# Patient Record
Sex: MALE | Race: WHITE | NOT HISPANIC OR LATINO | URBAN - METROPOLITAN AREA
[De-identification: names, ages, dates, MRNs, and addresses within clinical notes are randomized per-mention and may not be internally consistent; named-entity substitution may affect disease eponyms.]

---

## 2021-12-30 ENCOUNTER — INPATIENT (INPATIENT)
Facility: HOSPITAL | Age: 41
LOS: 2 days | Discharge: LEFT BEFORE DISCHARGE | DRG: 894 | End: 2022-01-02
Attending: HOSPITALIST | Admitting: HOSPITALIST
Payer: COMMERCIAL

## 2021-12-30 VITALS
SYSTOLIC BLOOD PRESSURE: 164 MMHG | TEMPERATURE: 98 F | WEIGHT: 199.96 LBS | HEART RATE: 110 BPM | DIASTOLIC BLOOD PRESSURE: 89 MMHG | RESPIRATION RATE: 18 BRPM | OXYGEN SATURATION: 94 % | HEIGHT: 72 IN

## 2021-12-30 LAB
ALBUMIN SERPL ELPH-MCNC: 4.6 G/DL — SIGNIFICANT CHANGE UP (ref 3.3–5)
ALP SERPL-CCNC: 129 U/L — HIGH (ref 40–120)
ALT FLD-CCNC: 65 U/L — HIGH (ref 10–45)
ANION GAP SERPL CALC-SCNC: 17 MMOL/L — SIGNIFICANT CHANGE UP (ref 5–17)
APAP SERPL-MCNC: <15 UG/ML — SIGNIFICANT CHANGE UP (ref 10–30)
AST SERPL-CCNC: 60 U/L — HIGH (ref 10–40)
BASOPHILS # BLD AUTO: 0.06 K/UL — SIGNIFICANT CHANGE UP (ref 0–0.2)
BASOPHILS NFR BLD AUTO: 0.7 % — SIGNIFICANT CHANGE UP (ref 0–2)
BILIRUB SERPL-MCNC: 0.2 MG/DL — SIGNIFICANT CHANGE UP (ref 0.2–1.2)
BUN SERPL-MCNC: 19 MG/DL — SIGNIFICANT CHANGE UP (ref 7–23)
CALCIUM SERPL-MCNC: 9.1 MG/DL — SIGNIFICANT CHANGE UP (ref 8.4–10.5)
CHLORIDE SERPL-SCNC: 104 MMOL/L — SIGNIFICANT CHANGE UP (ref 96–108)
CO2 SERPL-SCNC: 19 MMOL/L — LOW (ref 22–31)
CREAT SERPL-MCNC: 1.09 MG/DL — SIGNIFICANT CHANGE UP (ref 0.5–1.3)
EOSINOPHIL # BLD AUTO: 0.1 K/UL — SIGNIFICANT CHANGE UP (ref 0–0.5)
EOSINOPHIL NFR BLD AUTO: 1.1 % — SIGNIFICANT CHANGE UP (ref 0–6)
ETHANOL SERPL-MCNC: 402 MG/DL — HIGH (ref 0–10)
GLUCOSE SERPL-MCNC: 106 MG/DL — HIGH (ref 70–99)
HCT VFR BLD CALC: 48.2 % — SIGNIFICANT CHANGE UP (ref 39–50)
HGB BLD-MCNC: 16.5 G/DL — SIGNIFICANT CHANGE UP (ref 13–17)
IMM GRANULOCYTES NFR BLD AUTO: 0.2 % — SIGNIFICANT CHANGE UP (ref 0–1.5)
LYMPHOCYTES # BLD AUTO: 3.82 K/UL — HIGH (ref 1–3.3)
LYMPHOCYTES # BLD AUTO: 43.4 % — SIGNIFICANT CHANGE UP (ref 13–44)
MCHC RBC-ENTMCNC: 32 PG — SIGNIFICANT CHANGE UP (ref 27–34)
MCHC RBC-ENTMCNC: 34.2 GM/DL — SIGNIFICANT CHANGE UP (ref 32–36)
MCV RBC AUTO: 93.4 FL — SIGNIFICANT CHANGE UP (ref 80–100)
MONOCYTES # BLD AUTO: 0.54 K/UL — SIGNIFICANT CHANGE UP (ref 0–0.9)
MONOCYTES NFR BLD AUTO: 6.1 % — SIGNIFICANT CHANGE UP (ref 2–14)
NEUTROPHILS # BLD AUTO: 4.27 K/UL — SIGNIFICANT CHANGE UP (ref 1.8–7.4)
NEUTROPHILS NFR BLD AUTO: 48.5 % — SIGNIFICANT CHANGE UP (ref 43–77)
NRBC # BLD: 0 /100 WBCS — SIGNIFICANT CHANGE UP (ref 0–0)
PLATELET # BLD AUTO: 230 K/UL — SIGNIFICANT CHANGE UP (ref 150–400)
POTASSIUM SERPL-MCNC: 3.7 MMOL/L — SIGNIFICANT CHANGE UP (ref 3.5–5.3)
POTASSIUM SERPL-SCNC: 3.7 MMOL/L — SIGNIFICANT CHANGE UP (ref 3.5–5.3)
PROT SERPL-MCNC: 7.4 G/DL — SIGNIFICANT CHANGE UP (ref 6–8.3)
RBC # BLD: 5.16 M/UL — SIGNIFICANT CHANGE UP (ref 4.2–5.8)
RBC # FLD: 11.9 % — SIGNIFICANT CHANGE UP (ref 10.3–14.5)
SALICYLATES SERPL-MCNC: <2 MG/DL — LOW (ref 15–30)
SARS-COV-2 RNA SPEC QL NAA+PROBE: SIGNIFICANT CHANGE UP
SODIUM SERPL-SCNC: 140 MMOL/L — SIGNIFICANT CHANGE UP (ref 135–145)
WBC # BLD: 8.81 K/UL — SIGNIFICANT CHANGE UP (ref 3.8–10.5)
WBC # FLD AUTO: 8.81 K/UL — SIGNIFICANT CHANGE UP (ref 3.8–10.5)

## 2021-12-30 PROCEDURE — 99285 EMERGENCY DEPT VISIT HI MDM: CPT

## 2021-12-30 RX ORDER — HALOPERIDOL DECANOATE 100 MG/ML
5 INJECTION INTRAMUSCULAR ONCE
Refills: 0 | Status: COMPLETED | OUTPATIENT
Start: 2021-12-30 | End: 2021-12-30

## 2021-12-30 RX ORDER — DIPHENHYDRAMINE HCL 50 MG
25 CAPSULE ORAL ONCE
Refills: 0 | Status: COMPLETED | OUTPATIENT
Start: 2021-12-30 | End: 2021-12-30

## 2021-12-30 RX ADMIN — HALOPERIDOL DECANOATE 5 MILLIGRAM(S): 100 INJECTION INTRAMUSCULAR at 20:26

## 2021-12-30 RX ADMIN — Medication 25 MILLIGRAM(S): at 20:26

## 2021-12-30 RX ADMIN — Medication 2 MILLIGRAM(S): at 21:26

## 2021-12-30 NOTE — ED ADULT NURSE NOTE - NS ED NURSE LEVEL OF CONSCIOUSNESS MENTAL STATUS
Left message for patient to call back regarding below results    Surgical hx, HM updated  Pt added to waitlist       Awake/Alert

## 2021-12-30 NOTE — ED PROVIDER NOTE - PROGRESS NOTE DETAILS
CYNDI Zambrano MD: Pt extremely agitated, combative, punching doors to patient care room, repeating "I want out!" not able to be re-directed, appears to be a risk of harm to self and others. MIGUEL BENDER called, increased security and staff presence at bedside. Patient unable to be re-directed verbally. Decision made to medicate with haldol, ativan and benadryl. CYNDI Zambrano MD: Pt extremely agitated, combative, punching doors to patient care room, repeating "I want out!", calling staff the N-word, not able to be re-directed, appears to be a risk of harm to self and others. MIGUEL BENDER called, increased security and staff presence at bedside. Patient unable to be re-directed verbally. Decision made to medicate with haldol, ativan and benadryl and to physically restrained. Will re-evaluate. Pt signed out to me - on reassessment w tongue fasciculations, tremors, and tachycardic. Clinically withdrawing. CIWA ordered, given ativan. PT will need admission to hospitalist team, given CIWA recorded to be 18 at 9am by RN will need ICU consult. MILTON Garcia PGY3 Not a MICU candidate. Ordered for standing ativan per MICU reccs. Endorsed to hospitalist. MILTON Garcia PGY3
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
## 2021-12-30 NOTE — ED PROVIDER NOTE - PHYSICAL EXAMINATION
Gen: Alert and oriented. Aggressive behavior, shouting at staff.  HEENT: extra occular movements intact, no nasal discharge, mucous membranes moist  CV: Regular rate and rhythm, +S1/S2, no murmurs/rubs/gallops,   Resp: Clear to ausculation bilaterally, no wheezes/rhonchi/rales  GI: Abdomen soft non-distended, non tender to palpation, no masses  MSK: No open wounds, no bruising, no LE edema, Homans sign negative bl  Neuro: A&Ox4, following commands, moving all four extremities spontaneously  Psych: aggressive. Not redirectable Gen: Alert and oriented. Aggressive behavior, shouting at staff.  HEENT: extra occular movements intact, no nasal discharge, mucous membranes moist  CV: Unable to assess given aggression but extremities appear well perfused  Resp: No increased work of breathing. Unable to auscultate given aggression  GI: Unable to assess given aggression  MSK: No deformities or injuries appreciated but unable to fully assess to aggression  Neuro: A&Ox4, can follow commands, moving all four extremities spontaneously  Psych: aggressive. Not redirectable
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
## 2021-12-30 NOTE — ED PROVIDER NOTE - OBJECTIVE STATEMENT
40 yo M with pmh depression, cocaine use presenting with suicidal ideation. Patient presents acutely intoxicated and aggressive (Calling staff "N" word and acting with aggression toward staff). Therefore, patient was sedated and now resting comfortably. Per EMS, sister called because patient was endorsing SI after having drank a lot today.

## 2021-12-31 DIAGNOSIS — R45.851 SUICIDAL IDEATIONS: ICD-10-CM

## 2021-12-31 DIAGNOSIS — F19.10 OTHER PSYCHOACTIVE SUBSTANCE ABUSE, UNCOMPLICATED: ICD-10-CM

## 2021-12-31 DIAGNOSIS — F10.239 ALCOHOL DEPENDENCE WITH WITHDRAWAL, UNSPECIFIED: ICD-10-CM

## 2021-12-31 DIAGNOSIS — U07.1 COVID-19: ICD-10-CM

## 2021-12-31 DIAGNOSIS — Z29.9 ENCOUNTER FOR PROPHYLACTIC MEASURES, UNSPECIFIED: ICD-10-CM

## 2021-12-31 LAB
AMPHET UR-MCNC: NEGATIVE — SIGNIFICANT CHANGE UP
APPEARANCE UR: CLEAR — SIGNIFICANT CHANGE UP
BACTERIA # UR AUTO: NEGATIVE — SIGNIFICANT CHANGE UP
BARBITURATES UR SCN-MCNC: NEGATIVE — SIGNIFICANT CHANGE UP
BENZODIAZ UR-MCNC: NEGATIVE — SIGNIFICANT CHANGE UP
BILIRUB UR-MCNC: NEGATIVE — SIGNIFICANT CHANGE UP
COCAINE METAB.OTHER UR-MCNC: NEGATIVE — SIGNIFICANT CHANGE UP
COLOR SPEC: YELLOW — SIGNIFICANT CHANGE UP
DIFF PNL FLD: NEGATIVE — SIGNIFICANT CHANGE UP
EPI CELLS # UR: 1 /HPF — SIGNIFICANT CHANGE UP
FLUAV AG NPH QL: SIGNIFICANT CHANGE UP
FLUBV AG NPH QL: SIGNIFICANT CHANGE UP
GLUCOSE UR QL: NEGATIVE — SIGNIFICANT CHANGE UP
HYALINE CASTS # UR AUTO: 2 /LPF — SIGNIFICANT CHANGE UP (ref 0–2)
KETONES UR-MCNC: ABNORMAL
LEUKOCYTE ESTERASE UR-ACNC: NEGATIVE — SIGNIFICANT CHANGE UP
METHADONE UR-MCNC: NEGATIVE — SIGNIFICANT CHANGE UP
NITRITE UR-MCNC: NEGATIVE — SIGNIFICANT CHANGE UP
OPIATES UR-MCNC: NEGATIVE — SIGNIFICANT CHANGE UP
OXYCODONE UR-MCNC: NEGATIVE — SIGNIFICANT CHANGE UP
PCP SPEC-MCNC: SIGNIFICANT CHANGE UP
PCP UR-MCNC: NEGATIVE — SIGNIFICANT CHANGE UP
PH UR: 6 — SIGNIFICANT CHANGE UP (ref 5–8)
PROT UR-MCNC: ABNORMAL
RBC CASTS # UR COMP ASSIST: 2 /HPF — SIGNIFICANT CHANGE UP (ref 0–4)
RSV RNA NPH QL NAA+NON-PROBE: SIGNIFICANT CHANGE UP
SARS-COV-2 RNA SPEC QL NAA+PROBE: DETECTED
SP GR SPEC: 1.03 — HIGH (ref 1.01–1.02)
THC UR QL: NEGATIVE — SIGNIFICANT CHANGE UP
TSH SERPL-MCNC: 0.53 UIU/ML — SIGNIFICANT CHANGE UP (ref 0.27–4.2)
UROBILINOGEN FLD QL: NEGATIVE — SIGNIFICANT CHANGE UP
WBC UR QL: 1 /HPF — SIGNIFICANT CHANGE UP (ref 0–5)

## 2021-12-31 PROCEDURE — 99222 1ST HOSP IP/OBS MODERATE 55: CPT | Mod: GC

## 2021-12-31 PROCEDURE — 99223 1ST HOSP IP/OBS HIGH 75: CPT

## 2021-12-31 RX ORDER — THIAMINE MONONITRATE (VIT B1) 100 MG
100 TABLET ORAL ONCE
Refills: 0 | Status: COMPLETED | OUTPATIENT
Start: 2021-12-31 | End: 2021-12-31

## 2021-12-31 RX ORDER — ENOXAPARIN SODIUM 100 MG/ML
40 INJECTION SUBCUTANEOUS DAILY
Refills: 0 | Status: DISCONTINUED | OUTPATIENT
Start: 2021-12-31 | End: 2022-01-02

## 2021-12-31 RX ORDER — THIAMINE MONONITRATE (VIT B1) 100 MG
200 TABLET ORAL DAILY
Refills: 0 | Status: DISCONTINUED | OUTPATIENT
Start: 2021-12-31 | End: 2021-12-31

## 2021-12-31 RX ORDER — THIAMINE MONONITRATE (VIT B1) 100 MG
500 TABLET ORAL EVERY 8 HOURS
Refills: 0 | Status: DISCONTINUED | OUTPATIENT
Start: 2021-12-31 | End: 2022-01-02

## 2021-12-31 RX ORDER — THIAMINE MONONITRATE (VIT B1) 100 MG
500 TABLET ORAL EVERY 8 HOURS
Refills: 0 | Status: DISCONTINUED | OUTPATIENT
Start: 2021-12-31 | End: 2021-12-31

## 2021-12-31 RX ORDER — THIAMINE MONONITRATE (VIT B1) 100 MG
100 TABLET ORAL DAILY
Refills: 0 | Status: CANCELLED | OUTPATIENT
Start: 2022-01-04 | End: 2022-01-02

## 2021-12-31 RX ORDER — FOLIC ACID 0.8 MG
1 TABLET ORAL ONCE
Refills: 0 | Status: COMPLETED | OUTPATIENT
Start: 2021-12-31 | End: 2021-12-31

## 2021-12-31 RX ORDER — FOLIC ACID 0.8 MG
1 TABLET ORAL DAILY
Refills: 0 | Status: DISCONTINUED | OUTPATIENT
Start: 2021-12-31 | End: 2022-01-02

## 2021-12-31 RX ORDER — HYDRALAZINE HCL 50 MG
10 TABLET ORAL ONCE
Refills: 0 | Status: COMPLETED | OUTPATIENT
Start: 2021-12-31 | End: 2021-12-31

## 2021-12-31 RX ADMIN — Medication 2 MILLIGRAM(S): at 14:41

## 2021-12-31 RX ADMIN — Medication 1 MILLIGRAM(S): at 11:34

## 2021-12-31 RX ADMIN — Medication 2 MILLIGRAM(S): at 12:38

## 2021-12-31 RX ADMIN — Medication 10 MILLIGRAM(S): at 17:24

## 2021-12-31 RX ADMIN — Medication 2 MILLIGRAM(S): at 10:37

## 2021-12-31 RX ADMIN — Medication 4 MILLIGRAM(S): at 22:03

## 2021-12-31 RX ADMIN — Medication 50 MILLIGRAM(S): at 15:09

## 2021-12-31 RX ADMIN — Medication 100 MILLIGRAM(S): at 11:34

## 2021-12-31 RX ADMIN — Medication 105 MILLIGRAM(S): at 23:10

## 2021-12-31 RX ADMIN — Medication 4 MILLIGRAM(S): at 17:25

## 2021-12-31 NOTE — H&P ADULT - NSHPREVIEWOFSYSTEMS_GEN_ALL_CORE
REVIEW OF SYSTEMS:    CONSTITUTIONAL: No weakness, fevers or chills  EYES/ENT: No visual changes;  No vertigo or throat pain   NECK: No pain or stiffness  RESPIRATORY: No cough, wheezing, hemoptysis; No shortness of breath  CARDIOVASCULAR: No chest pain or palpitations  GASTROINTESTINAL: No abdominal or epigastric pain. No nausea, vomiting, or hematemesis; No diarrhea or constipation. No melena or hematochezia.  GENITOURINARY: No dysuria, frequency or hematuria  NEUROLOGICAL: No numbness or weakness  All other review of systems is negative unless indicated above. REVIEW OF SYSTEMS:    CONSTITUTIONAL: No weakness, fevers or chills  EYES/ENT: No visual changes;  No vertigo or throat pain   NECK: No pain or stiffness  RESPIRATORY: + cough, no wheezing, hemoptysis; No shortness of breath  CARDIOVASCULAR: No chest pain or palpitations  GASTROINTESTINAL: No abdominal or epigastric pain. No nausea, vomiting, or hematemesis; No diarrhea or constipation. No melena or hematochezia.  GENITOURINARY: No dysuria, frequency or hematuria  NEUROLOGICAL: No numbness or weakness  All other review of systems is negative unless indicated above.

## 2021-12-31 NOTE — CONSULT NOTE ADULT - ASSESSMENT
40 yo M with pmh depression, cocaine use presenting with alcohol withdrawal, MICU consulted for CIWA 18.   40 yo M with pmh depression, cocaine use presenting with alcohol withdrawal, MICU consulted for CIWA 18.    Not a MICU candidate at this time  Recommend standing ativan regimen instead of symptom triggered vs consider high risk ativan taper instead of symptom triggered  Start high dose thiamine, folic acid     NOTE NOT FINAL 42 yo M with pmh depression, cocaine use presenting with alcohol withdrawal, MICU consulted for CIWA 18.    Not a MICU candidate at this time  Recommend standing ativan regimen instead of symptom triggered vs consider high risk ativan taper instead of symptom triggered  Start high dose thiamine, folic acid   Monitor for refeeding syndrome  Psych cx.   Monitor LFT, and check DF.  Monitor respiratory status for covid.

## 2021-12-31 NOTE — H&P ADULT - NSHPPHYSICALEXAM_GEN_ALL_CORE
GENERAL: NAD, lying in bed comfortably  HEAD:  Atraumatic, Normocephalic  EYES: EOMI, PERRLA, conjunctiva and sclera clear  ENT: Moist mucous membranes  NECK: Supple, No JVD  CHEST/LUNG: Clear to auscultation bilaterally; No rales, rhonchi, wheezing, or rubs. Unlabored respirations  HEART: Regular rate and rhythm; No murmurs, rubs, or gallops  ABDOMEN: Bowel sounds present; Soft, Nontender, Nondistended. No hepatomegaly  EXTREMITIES:  2+ Peripheral Pulses, brisk capillary refill. No clubbing, cyanosis, or edema  NERVOUS SYSTEM:  Alert & Oriented X3, speech clear. No deficits   MSK: FROM all 4 extremities, full and equal strength  SKIN: No rashes or lesions GENERAL: NAD, lying in bed, diaphoretic  HEAD:  Atraumatic, Normocephalic  EYES: EOMI, PERRLA, conjunctiva and sclera clear  ENT: Moist mucous membranes  NECK: Supple, No JVD  CHEST/LUNG: Clear to auscultation bilaterally; No rales, rhonchi, wheezing, or rubs. Unlabored respirations  HEART: Regular rate and rhythm; No murmurs, rubs, or gallops  ABDOMEN: Bowel sounds present; Soft, Nontender, Nondistended. No hepatomegaly  EXTREMITIES:  2+ Peripheral Pulses, brisk capillary refill. No clubbing, cyanosis, or edema  NERVOUS SYSTEM:  Alert & Oriented X3, speech clear, tremors noted  MSK: FROM all 4 extremities, full and equal strength  SKIN: No rashes or lesions Vital Signs Last 24 Hrs  T(C): 36.8 (31 Dec 2021 16:30), Max: 37.1 (31 Dec 2021 04:00)  T(F): 98.3 (31 Dec 2021 16:30), Max: 98.8 (31 Dec 2021 04:00)  HR: 112 (31 Dec 2021 16:30) (76 - 112)  BP: 151/101 (31 Dec 2021 16:30) (136/78 - 168/110)  BP(mean): --  RR: 20 (31 Dec 2021 16:30) (16 - 20)  SpO2: 99% (31 Dec 2021 16:30) (94% - 100%)  GENERAL: NAD, lying in bed, diaphoretic  HEAD:  Atraumatic, Normocephalic  EYES: EOMI, PERRLA, conjunctiva and sclera clear  ENT: Moist mucous membranes  NECK: Supple, No JVD  CHEST/LUNG: Clear to auscultation bilaterally; No rales, rhonchi, wheezing, or rubs. Unlabored respirations  HEART: Regular rate and rhythm; No murmurs, rubs, or gallops  ABDOMEN: Bowel sounds present; Soft, Nontender, Nondistended. No hepatomegaly  EXTREMITIES:  2+ Peripheral Pulses, brisk capillary refill. No clubbing, cyanosis, or edema  NERVOUS SYSTEM:  Alert & Oriented X3, speech clear, tremors noted  MSK: FROM all 4 extremities, full and equal strength  SKIN: No rashes or lesions

## 2021-12-31 NOTE — H&P ADULT - PROBLEM SELECTOR PLAN 2
- PCR positive on 12/30, but has been feeling symptoms since last Saturday  - Currently saturating well on RA  - Conservative mgmt - PCR positive on 12/30, but has been feeling symptoms since last Saturday  - Currently saturating well on RA  - Non-vaccinated   - Conservative mgmt

## 2021-12-31 NOTE — ED ADULT NURSE REASSESSMENT NOTE - NS ED NURSE REASSESS COMMENT FT1
Ifeoma, spouse, [phone].
PT tearful, anxious.  CIWA 18 and attending and resident aware.
Pt combative throwing chair in room and punching door and threatening staff. Security called to bedside, MD aware, 1:1 in place.
Pt has been tachycardic and hypertensive all day.  CIWA above 13 since this morning.  Discussed at length with ED resident and medical resident q hourly CIWA scores and additional medications.  MICU refused pt.  MEdical resident also rejected additional CIWA/medication.
Pt reports feeling better after multi-medication modalities .  Comfortable appearing, resting.  Tremors visible with arms extended.  Call placed to pt's spouse Ifeoma.

## 2021-12-31 NOTE — CONSULT NOTE ADULT - ATTENDING COMMENTS
Agree with assessment and plan as discussed above. Patient with alcohol withdrawal, monitor CIWA, treat per CIWA protocol. Monitor for refeeding syndrome. Given thiamine. Monitor covid symptoms.     - Patient does not need MICU level of care at this time. Please reconsult if there are any changes in clinical status, hemodynamic parameters or worsening metabolic derangement.

## 2021-12-31 NOTE — H&P ADULT - PROBLEM SELECTOR PLAN 3
- Pt was intoxicated when stating he had SI, now denying SI/HI  - Psych follow-up - History cocaine, marijuana use  - Avoid beta blockers

## 2021-12-31 NOTE — H&P ADULT - NSHPLABSRESULTS_GEN_ALL_CORE
16.5   8.81  )-----------( 230      ( 30 Dec 2021 20:53 )             48.2       12-30    140  |  104  |  19  ----------------------------<  106<H>  3.7   |  19<L>  |  1.09    Ca    9.1      30 Dec 2021 20:53    TPro  7.4  /  Alb  4.6  /  TBili  0.2  /  DBili  x   /  AST  60<H>  /  ALT  65<H>  /  AlkPhos  129<H>  12-30                      Lactate Trend            CAPILLARY BLOOD GLUCOSE

## 2021-12-31 NOTE — H&P ADULT - ATTENDING COMMENTS
AGree with above. PAtient with ETOH misuse admitted with ETOH withdrawals, with CIWA up to 18. Also has mild COVID symptoms of UTI. :No prior intubation, no prior MICU, no prior hospitalizations for ETOH.    Labs reviewed: Mild anion gap, DF score <32  ECG personally reviewed; Sinus tachycardia without evidence of ST segment changes.     A/P 42 yo male admitted with ETOH withdrawal, COVID    #ETOH misuse with withdrawal  -given CIWA of 18, continue high risk CIWA with taper.  -social work consult  -given elevated AG, will presume lactic acidosis, give high dose thiamine  -DF <32, no need for steroid  -given normal plt, bili, albumin will hold off US liver as no biochemical evidence of cirrhosis.    #COVID  -mild symptoms, is 41 without risk factor for deterioration so would not qualify for mAB. saturating  99% RA, would not give remdesivir and not indicated for steroids at this time.  -clinically monitor    rest as above

## 2021-12-31 NOTE — CONSULT NOTE ADULT - SUBJECTIVE AND OBJECTIVE BOX
CHIEF COMPLAINT: Alcohol withdrawal     HPI:   40 yo M with pmh depression, cocaine use presenting with suicidal ideation. Patient presents acutely intoxicated and aggressive (Calling staff "N" word and acting with aggression toward staff). Therefore, patient was sedated and now resting comfortably. Per EMS, sister called because patient was endorsing SI after having drank a lot today.    Patient drinks 1pint of liquor a day for the past year. Never hospitalized for withdrawal in the past, no prior history of seizures. When patient with withdrawal symptoms in the past, he would self-medicate with alcohol.       PAST MEDICAL & SURGICAL HISTORY:  Depression, substance abuse       FAMILY HISTORY:      SOCIAL HISTORY:  Smoking: __ packs x ___ years  EtOH Use:  Marital Status:  Occupation:  Recent Travel:  Country of Birth:  Advance Directives:    Allergies    No Known Allergies    Intolerances        HOME MEDICATIONS:    REVIEW OF SYSTEMS:  Constitutional:   Eyes:  ENT:  CV:  Resp:  GI:  :  MSK:  Integumentary:  Neurological:  Psychiatric:  Endocrine:  Hematologic/Lymphatic:  Allergic/Immunologic:  [ ] All other systems negative  [ ] Unable to assess ROS because ________    OBJECTIVE:  ICU Vital Signs Last 24 Hrs  T(C): 37.1 (31 Dec 2021 04:00), Max: 37.1 (31 Dec 2021 04:00)  T(F): 98.8 (31 Dec 2021 04:00), Max: 98.8 (31 Dec 2021 04:00)  HR: 110 (31 Dec 2021 09:10) (76 - 110)  BP: 148/97 (31 Dec 2021 09:10) (136/78 - 164/89)  BP(mean): --  ABP: --  ABP(mean): --  RR: 20 (31 Dec 2021 09:10) (16 - 20)  SpO2: 98% (31 Dec 2021 09:10) (94% - 99%)        CAPILLARY BLOOD GLUCOSE          PHYSICAL EXAM:  General:   HEENT:   Lymph Nodes:  Neck:   Respiratory:   Cardiovascular:   Abdomen:   Extremities:   Skin:   Neurological:  Psychiatry:    HOSPITAL MEDICATIONS:  MEDICATIONS  (STANDING):    MEDICATIONS  (PRN):  LORazepam   Injectable 2 milliGRAM(s) IV Push every 2 hours PRN Symptom-triggered: each CIWA -Ar score 8 or GREATER      LABS:                        16.5   8.81  )-----------( 230      ( 30 Dec 2021 20:53 )             48.2     12-30    140  |  104  |  19  ----------------------------<  106<H>  3.7   |  19<L>  |  1.09    Ca    9.1      30 Dec 2021 20:53    TPro  7.4  /  Alb  4.6  /  TBili  0.2  /  DBili  x   /  AST  60<H>  /  ALT  65<H>  /  AlkPhos  129<H>  12-30              MICROBIOLOGY:     RADIOLOGY:  [ ] Reviewed and interpreted by me    EKG: CHIEF COMPLAINT: Alcohol withdrawal     HPI:   42 yo M with pmh depression, cocaine use presenting with suicidal ideation. Patient presents acutely intoxicated and aggressive (Calling staff "N" word and acting with aggression toward staff). Therefore, patient was sedated and now resting comfortably. Per EMS, sister called because patient was endorsing SI after having drank a lot today.    Patient seen after 4mg IV ativan administered, patient tremulous however calm and conversant. Patient has been drinking "forever," currently drinks 1pint of liquor a day for the past year. Never hospitalized for withdrawal in the past, no prior history of seizures. When patient with withdrawal symptoms in the past, he would self-medicate with alcohol. Patient denies headache, visual changes, hallucinations, cough, SOB, n/v/d/c, dysuria.       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 No nausea, vomiting, or hematemesis; No diarrhea or constipation. No melena or hematochezia.  BACK: No CVA tenderness  GENITOURINARY: No dysuria, frequency or hematuria  NEUROLOGICAL: No numbness or weakness  SKIN: No itching, rashes      OBJECTIVE:  ICU Vital Signs Last 24 Hrs  T(C): 37.1 (31 Dec 2021 04:00), Max: 37.1 (31 Dec 2021 04:00)  T(F): 98.8 (31 Dec 2021 04:00), Max: 98.8 (31 Dec 2021 04:00)  HR: 110 (31 Dec 2021 09:10) (76 - 110)  BP: 148/97 (31 Dec 2021 09:10) (136/78 - 164/89)  BP(mean): --  ABP: --  ABP(mean): --  RR: 20 (31 Dec 2021 09:10) (16 - 20)  SpO2: 98% (31 Dec 2021 09:10) (94% - 99%)        CAPILLARY BLOOD GLUCOSE          PHYSICAL EXAM:  GENERAL: Tremulous, conversant  HEAD:  Atraumatic, Normocephalic  EYES: EOMI, PERRLA, conjunctiva and sclera clear  NECK: Supple, no lymphadenopathy, no JVD  CHEST/LUNG: CTAB; No wheezes, rales, or rhonchi  HEART: Regular rate and rhythm; No murmurs, rubs, or gallops  ABDOMEN: Soft, non-tender, non-distended; normal bowel sounds, no organomegaly  EXTREMITIES:  2+ peripheral pulses b/l, No clubbing, cyanosis, or edema  NEUROLOGY: A&O x 3, no focal deficits  SKIN: No rashes or lesions  PSYCH: normal behavior, normal affect, normal speech    HOSPITAL MEDICATIONS:  MEDICATIONS  (STANDING):    MEDICATIONS  (PRN):  LORazepam   Injectable 2 milliGRAM(s) IV Push every 2 hours PRN Symptom-triggered: each CIWA -Ar score 8 or GREATER      LABS:                        16.5   8.81  )-----------( 230      ( 30 Dec 2021 20:53 )             48.2     12-30    140  |  104  |  19  ----------------------------<  106<H>  3.7   |  19<L>  |  1.09    Ca    9.1      30 Dec 2021 20:53    TPro  7.4  /  Alb  4.6  /  TBili  0.2  /  DBili  x   /  AST  60<H>  /  ALT  65<H>  /  AlkPhos  129<H>  12-30              MICROBIOLOGY:     RADIOLOGY:  [ ] Reviewed and interpreted by me    EKG:

## 2021-12-31 NOTE — H&P ADULT - PROBLEM SELECTOR PLAN 1
- Pt admits to drinking 1 pint bourbon every day  - Last drink last night, unknown time  - No previous history of hospitalizations for withdrawal or seizures  - MICU consulted due to elevated CIWA scores, not candidate for admission  - CIWA protocol  - Ativan taper and symptom-triggered  - Thiamine, folic acid, multivitamin - Pt admits to drinking 1 pint bourbon every day  - Last drink last night, unknown time  - No previous history of hospitalizations for withdrawal or seizures  - Currently experiencing withdrawal symptoms: anxiety, agitation, restlessness, tremor, diaphoresis  - Concern for delirium tremens with disturbance of cognition accompanied by tachycardia and hypertension, although time course not consistent with onset   - MICU consulted due to elevated CIWA scores, not candidate for admission  - c/w CIWA protocol  - c/w Ativan taper and symptom-triggered  - c/w thiamine, folic acid, multivitamin

## 2021-12-31 NOTE — H&P ADULT - HISTORY OF PRESENT ILLNESS
41M with PMH depression, alcohol use presents to the hospital overnight intoxicated and suicidal. Pt was aggressive and verbally abusive towards staff. He was noted to be yelling profanity and stating that he was trying to get to his sister's house to obtain more alcohol to kill himself. He was sedated in the ED with ativan, haldol, and benadryl overnight. This morning, he was more calm and alert, but did not have clear memory of what happened the night prior. He stated he has been drinking since he was 13 years old, and now drinks a pint of bourbon every day. He works as an  and drinks every day when he comes back home. He also smokes 1 ppd, but denies any other drug abuse, though states he has smoked marijuana. He admits to experiencing withdrawal in the past, but has never been hospitalized for. Denies withdrawal seizures. He denies chest pain, abdominal pain, sob, n/v/d, fevers, chills.     In the ED:    Vitals: /89, , T 98.3    Pt given folic acid 1, ativan 2 mg x2, thiamine 100, haldol 5, benadryl 25 41M with PMH depression, alcohol use presents to the hospital overnight intoxicated and suicidal. Pt was aggressive and verbally abusive towards staff. He was noted to be yelling profanity and stating that he was trying to get to his sister's house to obtain more alcohol to kill himself. He was sedated in the ED with ativan, haldol, and benadryl overnight. This morning, he was more calm and alert, but did not have clear memory of what happened the night prior. He stated he has been drinking since he was 13 years old, and now drinks a pint of bourbon every day. He works as an  and drinks every day when he comes back home. He also smokes 1 ppd, but denies any other drug abuse, though states he has smoked marijuana. He admits to experiencing withdrawal in the past, but has never been hospitalized for. Denies withdrawal seizures. He denies chest pain, abdominal pain, sob, n/v/d, fevers, chills, but does a runny nose and sore throat since last Saturday. He takes a medication for anxiety, but does not know the name.     In the ED:    Vitals: /89, , T 98.3    Pt given folic acid 1, ativan 2 mg x2, thiamine 100, haldol 5, benadryl 25 41M with PMH depression, alcohol use presents to the hospital overnight intoxicated and suicidal. Pt was aggressive and verbally abusive towards staff. He was noted to be yelling profanity and stating that he was trying to get to his sister's house to obtain more alcohol to kill himself. He was sedated in the ED with ativan, haldol, and benadryl overnight. This morning, he was more calm and alert, but did not have clear memory of what happened the night prior. He stated he has been drinking since he was 13 years old, and now drinks a pint of bourbon every day. He works as an  and drinks every day when he comes back home. He also smokes 1 ppd, but denies any other drug abuse, though states he has smoked marijuana. He admits to experiencing withdrawal in the past, but has never been hospitalized for. Denies withdrawal seizures. He denies chest pain, abdominal pain, sob, n/v/d, fevers, chills, but does a runny nose and sore throat since last Saturday. He is not vaccinated. He takes a medication for anxiety, but does not know the name.     In the ED:    Vitals: /89, , T 98.3    Pt given folic acid 1, ativan 2 mg x2, thiamine 100, haldol 5, benadryl 25

## 2021-12-31 NOTE — H&P ADULT - ASSESSMENT
41M with PMH depression, alcohol use admitted for alcohol withdrawal and COVID-19. On [facility] protocol.

## 2021-12-31 NOTE — H&P ADULT - PROBLEM SELECTOR PLAN 4
- Diet: Regular  - DVT ppx: SCDs  - Dispo: Home - Pt was intoxicated when stating he had SI, now denying SI/HI  - Psych follow-up

## 2022-01-01 ENCOUNTER — TRANSCRIPTION ENCOUNTER (OUTPATIENT)
Age: 42
End: 2022-01-01

## 2022-01-01 LAB
A1C WITH ESTIMATED AVERAGE GLUCOSE RESULT: 5.3 % — SIGNIFICANT CHANGE UP (ref 4–5.6)
ALBUMIN SERPL ELPH-MCNC: 4.7 G/DL — SIGNIFICANT CHANGE UP (ref 3.3–5)
ALP SERPL-CCNC: 107 U/L — SIGNIFICANT CHANGE UP (ref 40–120)
ALT FLD-CCNC: 52 U/L — HIGH (ref 10–45)
ANION GAP SERPL CALC-SCNC: 17 MMOL/L — SIGNIFICANT CHANGE UP (ref 5–17)
AST SERPL-CCNC: 53 U/L — HIGH (ref 10–40)
BILIRUB SERPL-MCNC: 1 MG/DL — SIGNIFICANT CHANGE UP (ref 0.2–1.2)
BUN SERPL-MCNC: 18 MG/DL — SIGNIFICANT CHANGE UP (ref 7–23)
CALCIUM SERPL-MCNC: 9.9 MG/DL — SIGNIFICANT CHANGE UP (ref 8.4–10.5)
CHLORIDE SERPL-SCNC: 95 MMOL/L — LOW (ref 96–108)
CO2 SERPL-SCNC: 23 MMOL/L — SIGNIFICANT CHANGE UP (ref 22–31)
CREAT SERPL-MCNC: 0.66 MG/DL — SIGNIFICANT CHANGE UP (ref 0.5–1.3)
D DIMER BLD IA.RAPID-MCNC: <150 NG/ML DDU — SIGNIFICANT CHANGE UP
ESTIMATED AVERAGE GLUCOSE: 105 MG/DL — SIGNIFICANT CHANGE UP (ref 68–114)
GLUCOSE SERPL-MCNC: 99 MG/DL — SIGNIFICANT CHANGE UP (ref 70–99)
HCT VFR BLD CALC: 50.2 % — HIGH (ref 39–50)
HGB BLD-MCNC: 17.6 G/DL — HIGH (ref 13–17)
INR BLD: 0.96 RATIO — SIGNIFICANT CHANGE UP (ref 0.88–1.16)
MAGNESIUM SERPL-MCNC: 2.2 MG/DL — SIGNIFICANT CHANGE UP (ref 1.6–2.6)
MCHC RBC-ENTMCNC: 32.1 PG — SIGNIFICANT CHANGE UP (ref 27–34)
MCHC RBC-ENTMCNC: 35.1 GM/DL — SIGNIFICANT CHANGE UP (ref 32–36)
MCV RBC AUTO: 91.4 FL — SIGNIFICANT CHANGE UP (ref 80–100)
NRBC # BLD: 0 /100 WBCS — SIGNIFICANT CHANGE UP (ref 0–0)
PHOSPHATE SERPL-MCNC: 3.8 MG/DL — SIGNIFICANT CHANGE UP (ref 2.5–4.5)
PLATELET # BLD AUTO: 192 K/UL — SIGNIFICANT CHANGE UP (ref 150–400)
POTASSIUM SERPL-MCNC: 3.6 MMOL/L — SIGNIFICANT CHANGE UP (ref 3.5–5.3)
POTASSIUM SERPL-SCNC: 3.6 MMOL/L — SIGNIFICANT CHANGE UP (ref 3.5–5.3)
PROT SERPL-MCNC: 7.6 G/DL — SIGNIFICANT CHANGE UP (ref 6–8.3)
PROTHROM AB SERPL-ACNC: 11.5 SEC — SIGNIFICANT CHANGE UP (ref 10.6–13.6)
RBC # BLD: 5.49 M/UL — SIGNIFICANT CHANGE UP (ref 4.2–5.8)
RBC # FLD: 11.6 % — SIGNIFICANT CHANGE UP (ref 10.3–14.5)
SODIUM SERPL-SCNC: 135 MMOL/L — SIGNIFICANT CHANGE UP (ref 135–145)
WBC # BLD: 7.67 K/UL — SIGNIFICANT CHANGE UP (ref 3.8–10.5)
WBC # FLD AUTO: 7.67 K/UL — SIGNIFICANT CHANGE UP (ref 3.8–10.5)

## 2022-01-01 PROCEDURE — 99233 SBSQ HOSP IP/OBS HIGH 50: CPT | Mod: GC

## 2022-01-01 RX ORDER — NICOTINE POLACRILEX 2 MG
1 GUM BUCCAL DAILY
Refills: 0 | Status: DISCONTINUED | OUTPATIENT
Start: 2022-01-01 | End: 2022-01-02

## 2022-01-01 RX ORDER — LANOLIN ALCOHOL/MO/W.PET/CERES
1 CREAM (GRAM) TOPICAL AT BEDTIME
Refills: 0 | Status: DISCONTINUED | OUTPATIENT
Start: 2022-01-01 | End: 2022-01-02

## 2022-01-01 RX ORDER — ESCITALOPRAM OXALATE 10 MG/1
5 TABLET, FILM COATED ORAL DAILY
Refills: 0 | Status: DISCONTINUED | OUTPATIENT
Start: 2022-01-01 | End: 2022-01-02

## 2022-01-01 RX ADMIN — Medication 105 MILLIGRAM(S): at 15:41

## 2022-01-01 RX ADMIN — Medication 1 PATCH: at 20:08

## 2022-01-01 RX ADMIN — Medication 105 MILLIGRAM(S): at 22:07

## 2022-01-01 RX ADMIN — ESCITALOPRAM OXALATE 5 MILLIGRAM(S): 10 TABLET, FILM COATED ORAL at 15:41

## 2022-01-01 RX ADMIN — Medication 4 MILLIGRAM(S): at 09:44

## 2022-01-01 RX ADMIN — ENOXAPARIN SODIUM 40 MILLIGRAM(S): 100 INJECTION SUBCUTANEOUS at 12:33

## 2022-01-01 RX ADMIN — Medication 4 MILLIGRAM(S): at 02:07

## 2022-01-01 RX ADMIN — Medication 105 MILLIGRAM(S): at 06:19

## 2022-01-01 RX ADMIN — Medication 1 TABLET(S): at 12:32

## 2022-01-01 RX ADMIN — Medication 2 MILLIGRAM(S): at 12:33

## 2022-01-01 RX ADMIN — Medication 3 MILLIGRAM(S): at 22:07

## 2022-01-01 RX ADMIN — Medication 3 MILLIGRAM(S): at 18:14

## 2022-01-01 RX ADMIN — Medication 3 MILLIGRAM(S): at 14:20

## 2022-01-01 RX ADMIN — Medication 1 MILLIGRAM(S): at 23:20

## 2022-01-01 RX ADMIN — Medication 4 MILLIGRAM(S): at 06:19

## 2022-01-01 RX ADMIN — Medication 2 MILLIGRAM(S): at 20:33

## 2022-01-01 RX ADMIN — Medication 1 MILLIGRAM(S): at 12:32

## 2022-01-01 RX ADMIN — Medication 1 PATCH: at 15:42

## 2022-01-01 NOTE — DISCHARGE NOTE PROVIDER - NSDCFUADDAPPT_GEN_ALL_CORE_FT
[facility] program: [phone]. Please call this number for further guidance on how to reduce your alcohol use

## 2022-01-01 NOTE — DISCHARGE NOTE PROVIDER - CARE PROVIDERS DIRECT ADDRESSES
[email or web address]

## 2022-01-01 NOTE — DISCHARGE NOTE PROVIDER - NSDCCPCAREPLAN_GEN_ALL_CORE_FT
PRINCIPAL DISCHARGE DIAGNOSIS  Diagnosis: Alcohol withdrawal  Assessment and Plan of Treatment: Alcohol withdrawal occurs when you stop drinking, or you drink less while having alcohol dependence. Symptoms begin as your body tries to get used to this change. You will be diagnosed with alcohol withdrawal if you have at least two symptoms after you limit or stop drinking. Common symptoms include shaking, throwing up, and sweating. These symptoms may make you anxious and unable to work or be around others. Symptoms occur within several hours to a few days after stopping alcohol, and are not caused by other health problems.  Treatment for alcohol dependence and withdrawal includes medicines, detoxification, and therapy. Diagnosing and treating alcohol dependence and withdrawal as soon as possible may relieve or prevent symptoms. With treatment and care, your alcohol dependence and withdrawal may be controlled, and your quality of life improved.  Avoid drinking alcohol: Set a goal for yourself to completely stop drinking. This will stop you from being dependent on it. This will also prevent you from developing alcohol withdrawal and other more serious health problems. Ask your caregiver if you should more of other liquids, such as water. Avoid caffeine, which may be found in coffee, tea, and sports drinks.  CONTACT A CAREGIVER IF: You cannot make it to your next meeting with your caregiver.  You feel you cannot cope at home, work, or in school.  You have new symptoms since the last time you visited your caregiver. Your symptoms are getting worse.  You have questions or concerns about your alcohol dependence or withdrawal, medicine, or care.  SEEK CARE IMMEDIATELY IF: You just had a convulsion. You have trouble breathing, chest pains, or a fast heartbeat. You feel like hurting yourself or someone else.

## 2022-01-01 NOTE — DISCHARGE NOTE PROVIDER - HOSPITAL COURSE
41M with PMH depression, alcohol use presents to the hospital overnight intoxicated and suicidal. Admitted for EtOH withdrawal, started on CIWA and Ativan taper. 41M with PMH depression, alcohol use presents to the hospital overnight intoxicated and suicidal. Admitted for EtOH withdrawal, started on CIWA and Ativan taper. Pt no longer endorsing suicidal thoughts. 41M with PMH depression, alcohol use presents to the hospital overnight intoxicated and suicidal. Admitted for EtOH withdrawal, started on CIWA and Ativan taper. Also found to be COVID+. Pt no longer endorsing suicidal thoughts. He completed 2 days of ativan therapy and then wished to leave. He had capacity to make this decision and was able to repeat the risks of leaving against medical advice, including seizure, possibly death. Return precautions given (worsening HA, tremors, hallucinations, feeling of suicide). no oxygen requirement on discharge.

## 2022-01-01 NOTE — DISCHARGE NOTE PROVIDER - CARE PROVIDER_API CALL
Ifeoma Pablo)  Internal Medicine  [address], [address]  [place] [address]  Phone: [phone]  Fax: [phone]  Follow Up Time:

## 2022-01-02 ENCOUNTER — EMERGENCY (EMERGENCY)
Facility: HOSPITAL | Age: 42
LOS: 1 days | Discharge: LEFT BEFORE DISCHARGE | End: 2022-01-02
Attending: EMERGENCY MEDICINE | Admitting: EMERGENCY MEDICINE
Payer: COMMERCIAL

## 2022-01-02 ENCOUNTER — TRANSCRIPTION ENCOUNTER (OUTPATIENT)
Age: 42
End: 2022-01-02

## 2022-01-02 VITALS
TEMPERATURE: 98 F | SYSTOLIC BLOOD PRESSURE: 159 MMHG | RESPIRATION RATE: 18 BRPM | OXYGEN SATURATION: 97 % | HEART RATE: 111 BPM | DIASTOLIC BLOOD PRESSURE: 89 MMHG

## 2022-01-02 VITALS
OXYGEN SATURATION: 96 % | HEIGHT: 72 IN | WEIGHT: 210.1 LBS | DIASTOLIC BLOOD PRESSURE: 92 MMHG | SYSTOLIC BLOOD PRESSURE: 144 MMHG | HEART RATE: 127 BPM | TEMPERATURE: 97 F | RESPIRATION RATE: 18 BRPM

## 2022-01-02 VITALS
DIASTOLIC BLOOD PRESSURE: 99 MMHG | HEART RATE: 117 BPM | OXYGEN SATURATION: 97 % | RESPIRATION RATE: 15 BRPM | SYSTOLIC BLOOD PRESSURE: 144 MMHG

## 2022-01-02 LAB
ALBUMIN SERPL ELPH-MCNC: 4.4 G/DL — SIGNIFICANT CHANGE UP (ref 3.3–5)
ALBUMIN SERPL ELPH-MCNC: 4.9 G/DL — SIGNIFICANT CHANGE UP (ref 3.3–5)
ALP SERPL-CCNC: 113 U/L — SIGNIFICANT CHANGE UP (ref 40–120)
ALP SERPL-CCNC: 114 U/L — SIGNIFICANT CHANGE UP (ref 40–120)
ALT FLD-CCNC: 111 U/L — HIGH (ref 10–45)
ALT FLD-CCNC: 64 U/L — HIGH (ref 10–45)
ANION GAP SERPL CALC-SCNC: 15 MMOL/L — SIGNIFICANT CHANGE UP (ref 5–17)
ANION GAP SERPL CALC-SCNC: 19 MMOL/L — HIGH (ref 5–17)
AST SERPL-CCNC: 65 U/L — HIGH (ref 10–40)
AST SERPL-CCNC: 88 U/L — HIGH (ref 10–40)
BASOPHILS # BLD AUTO: 0.03 K/UL — SIGNIFICANT CHANGE UP (ref 0–0.2)
BASOPHILS NFR BLD AUTO: 0.3 % — SIGNIFICANT CHANGE UP (ref 0–2)
BILIRUB SERPL-MCNC: 0.7 MG/DL — SIGNIFICANT CHANGE UP (ref 0.2–1.2)
BILIRUB SERPL-MCNC: 1 MG/DL — SIGNIFICANT CHANGE UP (ref 0.2–1.2)
BUN SERPL-MCNC: 15 MG/DL — SIGNIFICANT CHANGE UP (ref 7–23)
BUN SERPL-MCNC: 16 MG/DL — SIGNIFICANT CHANGE UP (ref 7–23)
CALCIUM SERPL-MCNC: 10.2 MG/DL — SIGNIFICANT CHANGE UP (ref 8.4–10.5)
CALCIUM SERPL-MCNC: 10.3 MG/DL — SIGNIFICANT CHANGE UP (ref 8.4–10.5)
CHLORIDE SERPL-SCNC: 93 MMOL/L — LOW (ref 96–108)
CHLORIDE SERPL-SCNC: 99 MMOL/L — SIGNIFICANT CHANGE UP (ref 96–108)
CO2 SERPL-SCNC: 18 MMOL/L — LOW (ref 22–31)
CO2 SERPL-SCNC: 24 MMOL/L — SIGNIFICANT CHANGE UP (ref 22–31)
CREAT SERPL-MCNC: 0.75 MG/DL — SIGNIFICANT CHANGE UP (ref 0.5–1.3)
CREAT SERPL-MCNC: 0.88 MG/DL — SIGNIFICANT CHANGE UP (ref 0.5–1.3)
EOSINOPHIL # BLD AUTO: 0.04 K/UL — SIGNIFICANT CHANGE UP (ref 0–0.5)
EOSINOPHIL NFR BLD AUTO: 0.4 % — SIGNIFICANT CHANGE UP (ref 0–6)
GLUCOSE SERPL-MCNC: 122 MG/DL — HIGH (ref 70–99)
GLUCOSE SERPL-MCNC: 126 MG/DL — HIGH (ref 70–99)
HCT VFR BLD CALC: 47.5 % — SIGNIFICANT CHANGE UP (ref 39–50)
HCT VFR BLD CALC: 49.3 % — SIGNIFICANT CHANGE UP (ref 39–50)
HGB BLD-MCNC: 17 G/DL — SIGNIFICANT CHANGE UP (ref 13–17)
HGB BLD-MCNC: 17.5 G/DL — HIGH (ref 13–17)
IMM GRANULOCYTES NFR BLD AUTO: 0.4 % — SIGNIFICANT CHANGE UP (ref 0–1.5)
INR BLD: 0.95 RATIO — SIGNIFICANT CHANGE UP (ref 0.88–1.16)
LIDOCAIN IGE QN: 63 U/L — LOW (ref 73–393)
LYMPHOCYTES # BLD AUTO: 2.69 K/UL — SIGNIFICANT CHANGE UP (ref 1–3.3)
LYMPHOCYTES # BLD AUTO: 24.1 % — SIGNIFICANT CHANGE UP (ref 13–44)
MAGNESIUM SERPL-MCNC: 2 MG/DL — SIGNIFICANT CHANGE UP (ref 1.6–2.6)
MAGNESIUM SERPL-MCNC: 2.3 MG/DL — SIGNIFICANT CHANGE UP (ref 1.6–2.6)
MCHC RBC-ENTMCNC: 31.8 PG — SIGNIFICANT CHANGE UP (ref 27–34)
MCHC RBC-ENTMCNC: 32.6 PG — SIGNIFICANT CHANGE UP (ref 27–34)
MCHC RBC-ENTMCNC: 35.5 GM/DL — SIGNIFICANT CHANGE UP (ref 32–36)
MCHC RBC-ENTMCNC: 35.8 GM/DL — SIGNIFICANT CHANGE UP (ref 32–36)
MCV RBC AUTO: 88.8 FL — SIGNIFICANT CHANGE UP (ref 80–100)
MCV RBC AUTO: 91.8 FL — SIGNIFICANT CHANGE UP (ref 80–100)
MONOCYTES # BLD AUTO: 1.16 K/UL — HIGH (ref 0–0.9)
MONOCYTES NFR BLD AUTO: 10.4 % — SIGNIFICANT CHANGE UP (ref 2–14)
NEUTROPHILS # BLD AUTO: 7.21 K/UL — SIGNIFICANT CHANGE UP (ref 1.8–7.4)
NEUTROPHILS NFR BLD AUTO: 64.4 % — SIGNIFICANT CHANGE UP (ref 43–77)
NRBC # BLD: 0 /100 WBCS — SIGNIFICANT CHANGE UP (ref 0–0)
NRBC # BLD: 0 /100 WBCS — SIGNIFICANT CHANGE UP (ref 0–0)
PHOSPHATE SERPL-MCNC: 4.1 MG/DL — SIGNIFICANT CHANGE UP (ref 2.5–4.5)
PLATELET # BLD AUTO: 219 K/UL — SIGNIFICANT CHANGE UP (ref 150–400)
PLATELET # BLD AUTO: 227 K/UL — SIGNIFICANT CHANGE UP (ref 150–400)
POTASSIUM SERPL-MCNC: 3.1 MMOL/L — LOW (ref 3.5–5.3)
POTASSIUM SERPL-MCNC: 4 MMOL/L — SIGNIFICANT CHANGE UP (ref 3.5–5.3)
POTASSIUM SERPL-SCNC: 3.1 MMOL/L — LOW (ref 3.5–5.3)
POTASSIUM SERPL-SCNC: 4 MMOL/L — SIGNIFICANT CHANGE UP (ref 3.5–5.3)
PROT SERPL-MCNC: 7.8 G/DL — SIGNIFICANT CHANGE UP (ref 6–8.3)
PROT SERPL-MCNC: 8.5 G/DL — HIGH (ref 6–8.3)
PROTHROM AB SERPL-ACNC: 11.4 SEC — SIGNIFICANT CHANGE UP (ref 10.6–13.6)
RBC # BLD: 5.35 M/UL — SIGNIFICANT CHANGE UP (ref 4.2–5.8)
RBC # BLD: 5.37 M/UL — SIGNIFICANT CHANGE UP (ref 4.2–5.8)
RBC # FLD: 11.4 % — SIGNIFICANT CHANGE UP (ref 10.3–14.5)
RBC # FLD: 11.5 % — SIGNIFICANT CHANGE UP (ref 10.3–14.5)
SODIUM SERPL-SCNC: 130 MMOL/L — LOW (ref 135–145)
SODIUM SERPL-SCNC: 138 MMOL/L — SIGNIFICANT CHANGE UP (ref 135–145)
WBC # BLD: 11.18 K/UL — HIGH (ref 3.8–10.5)
WBC # BLD: 9.33 K/UL — SIGNIFICANT CHANGE UP (ref 3.8–10.5)
WBC # FLD AUTO: 11.18 K/UL — HIGH (ref 3.8–10.5)
WBC # FLD AUTO: 9.33 K/UL — SIGNIFICANT CHANGE UP (ref 3.8–10.5)

## 2022-01-02 PROCEDURE — 99285 EMERGENCY DEPT VISIT HI MDM: CPT | Mod: 25

## 2022-01-02 PROCEDURE — 96375 TX/PRO/DX INJ NEW DRUG ADDON: CPT

## 2022-01-02 PROCEDURE — 85379 FIBRIN DEGRADATION QUANT: CPT

## 2022-01-02 PROCEDURE — 99285 EMERGENCY DEPT VISIT HI MDM: CPT

## 2022-01-02 PROCEDURE — 83036 HEMOGLOBIN GLYCOSYLATED A1C: CPT

## 2022-01-02 PROCEDURE — 81001 URINALYSIS AUTO W/SCOPE: CPT

## 2022-01-02 PROCEDURE — 96372 THER/PROPH/DIAG INJ SC/IM: CPT | Mod: XU

## 2022-01-02 PROCEDURE — 83690 ASSAY OF LIPASE: CPT

## 2022-01-02 PROCEDURE — 87637 SARSCOV2&INF A&B&RSV AMP PRB: CPT

## 2022-01-02 PROCEDURE — 85025 COMPLETE CBC W/AUTO DIFF WBC: CPT

## 2022-01-02 PROCEDURE — 84443 ASSAY THYROID STIM HORMONE: CPT

## 2022-01-02 PROCEDURE — 83735 ASSAY OF MAGNESIUM: CPT

## 2022-01-02 PROCEDURE — 71045 X-RAY EXAM CHEST 1 VIEW: CPT | Mod: 26

## 2022-01-02 PROCEDURE — U0003: CPT

## 2022-01-02 PROCEDURE — 80053 COMPREHEN METABOLIC PANEL: CPT

## 2022-01-02 PROCEDURE — 99239 HOSP IP/OBS DSCHRG MGMT >30: CPT | Mod: GC

## 2022-01-02 PROCEDURE — 93005 ELECTROCARDIOGRAM TRACING: CPT

## 2022-01-02 PROCEDURE — 93010 ELECTROCARDIOGRAM REPORT: CPT

## 2022-01-02 PROCEDURE — 85610 PROTHROMBIN TIME: CPT

## 2022-01-02 PROCEDURE — 80307 DRUG TEST PRSMV CHEM ANLYZR: CPT

## 2022-01-02 PROCEDURE — 85027 COMPLETE CBC AUTOMATED: CPT

## 2022-01-02 PROCEDURE — 84100 ASSAY OF PHOSPHORUS: CPT

## 2022-01-02 PROCEDURE — 96374 THER/PROPH/DIAG INJ IV PUSH: CPT

## 2022-01-02 PROCEDURE — 36415 COLL VENOUS BLD VENIPUNCTURE: CPT

## 2022-01-02 PROCEDURE — 71045 X-RAY EXAM CHEST 1 VIEW: CPT

## 2022-01-02 RX ORDER — FOLIC ACID 0.8 MG
1 TABLET ORAL
Qty: 30 | Refills: 0
Start: 2022-01-02 | End: 2022-01-31

## 2022-01-02 RX ORDER — SODIUM CHLORIDE 9 MG/ML
1000 INJECTION INTRAMUSCULAR; INTRAVENOUS; SUBCUTANEOUS ONCE
Refills: 0 | Status: COMPLETED | OUTPATIENT
Start: 2022-01-02 | End: 2022-01-02

## 2022-01-02 RX ORDER — THIAMINE MONONITRATE (VIT B1) 100 MG
1 TABLET ORAL
Qty: 30 | Refills: 0
Start: 2022-01-02 | End: 2022-01-31

## 2022-01-02 RX ORDER — POTASSIUM CHLORIDE 20 MEQ
40 PACKET (EA) ORAL EVERY 4 HOURS
Refills: 0 | Status: DISCONTINUED | OUTPATIENT
Start: 2022-01-02 | End: 2022-01-02

## 2022-01-02 RX ADMIN — Medication 3 MILLIGRAM(S): at 02:11

## 2022-01-02 RX ADMIN — Medication 2 MILLIGRAM(S): at 07:53

## 2022-01-02 RX ADMIN — Medication 40 MILLIEQUIVALENT(S): at 10:17

## 2022-01-02 RX ADMIN — SODIUM CHLORIDE 1000 MILLILITER(S): 9 INJECTION INTRAMUSCULAR; INTRAVENOUS; SUBCUTANEOUS at 21:36

## 2022-01-02 RX ADMIN — Medication 3 MILLIGRAM(S): at 06:12

## 2022-01-02 RX ADMIN — Medication 3 MILLIGRAM(S): at 10:17

## 2022-01-02 RX ADMIN — Medication 105 MILLIGRAM(S): at 06:12

## 2022-01-02 RX ADMIN — Medication 1 PATCH: at 07:38

## 2022-01-02 RX ADMIN — Medication 2 MILLIGRAM(S): at 21:36

## 2022-01-02 NOTE — PROGRESS NOTE ADULT - PROBLEM SELECTOR PLAN 4
- Pt was intoxicated when stating he had SI, now denying SI/HI  - Psych follow-up
- Pt was intoxicated when stating he had SI, now denying SI/HI  - Psych follow-up

## 2022-01-02 NOTE — PROGRESS NOTE ADULT - PROBLEM SELECTOR PLAN 1
- Pt admits to drinking 1 pint bourbon every day  - Last drink night of 12/30, unknown time  - No previous history of hospitalizations for withdrawal or seizures  - Currently experiencing withdrawal symptoms: anxiety, agitation, restlessness, tremors, diaphoresis  - Concern for delirium tremens with disturbance of cognition accompanied by tachycardia and hypertension, although time course not consistent with onset   - MICU consulted due to elevated CIWA scores, not candidate for admission  - c/w CIWA protocol  - c/w Ativan taper and symptom-triggered (has not required symptom-triggered in last 12 hrs)  - c/w thiamine, folic acid, multivitamin
- Pt admits to drinking 1 pint bourbon every day  - Last drink night of 12/30, unknown time  - No previous history of hospitalizations for withdrawal or seizures  - Currently experiencing withdrawal symptoms: anxiety, agitation, restlessness, tremors, diaphoresis  - Concern for delirium tremens with disturbance of cognition accompanied by tachycardia and hypertension, although time course not consistent with onset   - MICU consulted due to elevated CIWA scores, not candidate for admission  - c/w CIWA protocol  - c/w Ativan taper and symptom-triggered (has not required symptom-triggered in last 12 hrs)  - c/w thiamine, folic acid, multivitamin

## 2022-01-02 NOTE — PROGRESS NOTE ADULT - SUBJECTIVE AND OBJECTIVE BOX
Patient is a 41y old  Male who presents with a chief complaint of Alcohol withdrawal (31 Dec 2021 14:23)      INTERVAL HPI/OVERNIGHT EVENTS: WAN overnight. CIWA scores between 2 and 5. Spoke to pt and wife over phone at bedside. Reaffirmed pt has been having a lot of social stressors with the pandemic and has turned to alcohol as a way of self-medicating. Pt asking to go home, but wife states she wants him to finish detox in hospital.       REVIEW OF SYSTEMS:    CONSTITUTIONAL: No weakness, fevers or chills  EYES/ENT: No visual changes;  No vertigo or throat pain   NECK: No pain or stiffness  RESPIRATORY: No cough, wheezing, hemoptysis; No shortness of breath  CARDIOVASCULAR: No chest pain or palpitations  GASTROINTESTINAL: No abdominal or epigastric pain. No nausea, vomiting, or hematemesis; No diarrhea or constipation. No melena or hematochezia.  GENITOURINARY: No dysuria, frequency or hematuria  NEUROLOGICAL: No numbness or weakness  All other review of systems is negative unless indicated above.    FAMILY HISTORY:  No pertinent family history in first degree relatives      T(C): 36.8 (01-01-22 @ 05:30), Max: 37.1 (12-31-21 @ 17:22)  HR: 97 (01-01-22 @ 05:30) (97 - 118)  BP: 145/96 (01-01-22 @ 05:30) (136/83 - 168/110)  RR: 18 (01-01-22 @ 05:30) (18 - 20)  SpO2: 97% (01-01-22 @ 05:30) (95% - 100%)  Wt(kg): --Vital Signs Last 24 Hrs  T(C): 36.8 (01 Jan 2022 05:30), Max: 37.1 (31 Dec 2021 17:22)  T(F): 98.3 (01 Jan 2022 05:30), Max: 98.7 (31 Dec 2021 17:22)  HR: 97 (01 Jan 2022 05:30) (97 - 118)  BP: 145/96 (01 Jan 2022 05:30) (136/83 - 168/110)  BP(mean): --  RR: 18 (01 Jan 2022 05:30) (18 - 20)  SpO2: 97% (01 Jan 2022 05:30) (95% - 100%)    PHYSICAL EXAM:  GENERAL: NAD, well-groomed, well-developed  HEAD:  Atraumatic, Normocephalic  EYES: EOMI, PERRLA, conjunctiva and sclera clear  ENMT: No tonsillar erythema, exudates, or enlargement; Moist mucous membranes, Good dentition, No lesions  NECK: Supple, No JVD, Normal thyroid  NERVOUS SYSTEM:  Alert & Oriented X3, somnolent  CHEST/LUNG: Clear to percussion bilaterally; No rales, rhonchi, wheezing, or rubs  HEART: Regular rate and rhythm; No murmurs, rubs, or gallops  ABDOMEN: Soft, Nontender, Nondistended; Bowel sounds present  EXTREMITIES:  2+ Peripheral Pulses, No clubbing, cyanosis, or edema  LYMPH: No lymphadenopathy noted  SKIN: No rashes or lesions    Consultant(s) Notes Reviewed:  [x ] YES  [ ] NO  Care Discussed with Consultants/Other Providers [ x] YES  [ ] NO    LABS:                        17.6   7.67  )-----------( 192      ( 01 Jan 2022 08:03 )             50.2     01 Jan 2022 06:48    135    |  95     |  18     ----------------------------<  99     3.6     |  23     |  0.66     Ca    9.9        01 Jan 2022 06:48  Phos  3.8       01 Jan 2022 06:48  Mg     2.2       01 Jan 2022 06:48    TPro  7.6    /  Alb  4.7    /  TBili  1.0    /  DBili  x      /  AST  53     /  ALT  52     /  AlkPhos  107    01 Jan 2022 06:48      CAPILLARY BLOOD GLUCOSE        BLOOD CULTURE      RADIOLOGY & ADDITIONAL TESTS:    Imaging Personally Reviewed:  [ ] YES  [ ] NO  enoxaparin Injectable 40 milliGRAM(s) SubCutaneous daily  folic acid 1 milliGRAM(s) Oral daily  LORazepam     Tablet 2 milliGRAM(s) Oral every 2 hours PRN  LORazepam   Injectable 2 milliGRAM(s) IV Push every 1 hour PRN  LORazepam   Injectable   IV Push   LORazepam   Injectable 4 milliGRAM(s) IV Push every 4 hours  LORazepam   Injectable 3 milliGRAM(s) IV Push every 4 hours  multivitamin 1 Tablet(s) Oral daily  thiamine IVPB 500 milliGRAM(s) IV Intermittent every 8 hours      HEALTH ISSUES - PROBLEM Dx:  Alcohol withdrawal    2019 novel coronavirus disease (COVID-19)    Drug abuse, episodic use    Suicidal intent    Prophylactic measure          
PROGRESS NOTE:     CONTACT INFO:  Kelvin Lewis MD  Internal Medicine PGY2  Pager: [phone]    Patient is a 41y old  Male who presents with a chief complaint of Alcohol withdrawal (2022 12:35)      SUBJECTIVE / OVERNIGHT EVENTS:  No acute events overnight.     MEDICATIONS  (STANDING):  enoxaparin Injectable 40 milliGRAM(s) SubCutaneous daily  escitalopram 5 milliGRAM(s) Oral daily  folic acid 1 milliGRAM(s) Oral daily  LORazepam   Injectable   IV Push   LORazepam   Injectable 3 milliGRAM(s) IV Push every 4 hours  LORazepam   Injectable 2 milliGRAM(s) IV Push every 4 hours  melatonin 1 milliGRAM(s) Oral at bedtime  multivitamin 1 Tablet(s) Oral daily  nicotine -   7 mG/24Hr(s) Patch 1 patch Transdermal daily  thiamine IVPB 500 milliGRAM(s) IV Intermittent every 8 hours    MEDICATIONS  (PRN):  LORazepam     Tablet 2 milliGRAM(s) Oral every 2 hours PRN Symptom-triggered 2 point increase in CIWA-Ar  LORazepam   Injectable 2 milliGRAM(s) IV Push every 1 hour PRN Symptom-triggered: each CIWA -Ar score 8 or GREATER      CAPILLARY BLOOD GLUCOSE        I&O's Summary      PHYSICAL EXAM:  Vital Signs Last 24 Hrs  T(C): 36.4 (2022 04:30), Max: 36.9 (2022 14:31)  T(F): 97.6 (2022 04:30), Max: 98.5 (2022 14:31)  HR: 116 (2022 04:30) (97 - 125)  BP: 140/99 (2022 04:30) (129/91 - 150/95)  BP(mean): --  RR: 18 (2022 04:30) (16 - 18)  SpO2: 95% (2022 04:30) (95% - 98%)    GENERAL: NAD, well-groomed, well-developed  HEAD:  Atraumatic, Normocephalic  EYES: EOMI, PERRLA, conjunctiva and sclera clear  ENMT: No tonsillar erythema, exudates, or enlargement; Moist mucous membranes, Good dentition, No lesions  NECK: Supple, No JVD, Normal thyroid  NERVOUS SYSTEM:  Alert & Oriented X3, somnolent  CHEST/LUNG: Clear to percussion bilaterally; No rales, rhonchi, wheezing, or rubs  HEART: Regular rate and rhythm; No murmurs, rubs, or gallops  ABDOMEN: Soft, Nontender, Nondistended; Bowel sounds present  EXTREMITIES:  2+ Peripheral Pulses, No clubbing, cyanosis, or edema  LYMPH: No lymphadenopathy noted  SKIN: No rashes or lesions    LABS:                        17.0   9.33  )-----------( 219      ( 2022 07:28 )             47.5         135  |  95<L>  |  18  ----------------------------<  99  3.6   |  23  |  0.66    Ca    9.9      2022 06:48  Phos  3.8       Mg     2.2         TPro  7.6  /  Alb  4.7  /  TBili  1.0  /  DBili  x   /  AST  53<H>  /  ALT  52<H>  /  AlkPhos  107      PT/INR - ( 2022 07:28 )   PT: 11.4 sec;   INR: 0.95 ratio               Urinalysis Basic - ( 31 Dec 2021 18:17 )    Color: Yellow / Appearance: Clear / S.028 / pH: x  Gluc: x / Ketone: Small  / Bili: Negative / Urobili: Negative   Blood: x / Protein: 30 mg/dL / Nitrite: Negative   Leuk Esterase: Negative / RBC: 2 /hpf / WBC 1 /HPF   Sq Epi: x / Non Sq Epi: 1 /hpf / Bacteria: Negative          RADIOLOGY & ADDITIONAL TESTS:  Results Reviewed:   Imaging Personally Reviewed:  Electrocardiogram Personally Reviewed:    COORDINATION OF CARE:  Care Discussed with Consultants/Other Providers [Y/N]:  Prior or Outpatient Records Reviewed [Y/N]:

## 2022-01-02 NOTE — PROGRESS NOTE ADULT - ATTENDING COMMENTS
Patient still tachycardic, still in withdrawals. Has capacity, wants to leave AMA knowing risk of seizure, DT and death, able to verbalize to me. Advised to follow up with [facility] program as well as PMD for naltrexone initiation.  d/c planning 31 minutes.
Faye seen and evaluated. CIWA 2-5. Autonomic instability improving from yesterday. Patient states wants to leave. Understands risk of seizure or DT and possible death and was able to explain back to me the risks. He wanted me to speak to his wife, which I did on speaker phone in front of him. He would have a place in house to quarantine. Discussed desire to keep at least another 24-48 hours to ensure no DT.  He will discuss with wife. Also offered [facility] program and naltrexone as long as we can arrange follow up.    #COVID  -not symptomatic.    d/c planning 32 minutes if leaves ama.

## 2022-01-02 NOTE — ED PROVIDER NOTE - ENMT, MLM
Airway patent, Nasal mucosa clear. Mouth with normal mucosa. Throat has no vesicles, no oropharyngeal exudates and uvula is midline. +tongue fasciculation Airway patent, Nasal mucosa clear. Mouth with normal mucosa. Throat has no vesicles, no oropharyngeal exudates and uvula is midline.

## 2022-01-02 NOTE — ED PROVIDER NOTE - NSFOLLOWUPINSTRUCTIONS_ED_ALL_ED_FT
Follow Up in 1-3 Days with your own doctor or with  [address] [address]  Phone: [phone]    - you should isolate as directed for covid19 infection  - take tylenol for fever or pain  - Follow Up in 1-3 Days with your own doctor or with  [address] [address]  Phone: [phone]    - return to ER for worse breathing, low oxygen levels (less than 92%) or other concerns    COVID-19 (Coronavirus Disease 2019)    WHAT YOU NEED TO KNOW:    COVID-19 is the disease caused by the 2019 novel (new) coronavirus. It was first found in individuals in a part of [place] in late 2019. The virus is spreading to other countries as infected persons travel. Coronaviruses generally cause respiratory (nose, throat, and lung) infections, such as a cold. They can also cause serious infections such as Middle East respiratory syndrome (MERS) and severe acute respiratory syndrome (SARS). The new virus is related to the SARS coronavirus, so it is officially named SARS coronavirus 2 (SARS-CoV-2).    DISCHARGE INSTRUCTIONS:    If you think you or someone you know may be infected: It is important for anyone who may be infected to get tested right away. Do the following to protect others:     If emergency care is needed, tell the  about the possible infection, or call ahead and tell the emergency department.      Call a healthcare provider to be seen in the office. Anyone who may be infected should not arrive without calling first. The provider will need to protect staff members and other patients.      The person who may be infected needs to wear a medical mask before getting medical care. The mask needs to stay on until the provider says to remove it.    Call your local emergency number (911 in the [place]) or go to the emergency department if: You have signs or symptoms of COVID-19, and any of the following is true:     You traveled within the last 14 days to an area where the virus is active or had close contact with someone who did.      You had close contact within the last 14 days with someone who has a confirmed infection.    Call your doctor if: You do not have signs or symptoms of COVID-19, but any of the following is true:     You traveled within the last 14 days to an area where the virus is active or had close contact with someone who did.      You had close contact within the last 14 days with someone who has a confirmed infection.      You have questions or concerns about your condition or care.    Medicines: You may need any of the following for mild symptoms:     Decongestants help reduce nasal congestion and help you breathe more easily. If you take decongestant pills, they may make you feel restless or cause problems with your sleep. Do not use decongestant sprays for more than a few days.      Cough suppressants help reduce coughing. Ask your healthcare provider which type of cough medicine is best for you.      Acetaminophen decreases pain and fever. It is available without a doctor's order. Ask how much to take and how often to take it. Follow directions. Read the labels of all other medicines you are using to see if they also contain acetaminophen, or ask your doctor or pharmacist. Acetaminophen can cause liver damage if not taken correctly. Do not use more than 4 grams (4,000 milligrams) total of acetaminophen in one day.       NSAIDs, such as ibuprofen, help decrease swelling, pain, and fever. NSAIDs can cause stomach bleeding or kidney problems in certain people. If you take blood thinner medicine, always ask your healthcare provider if NSAIDs are safe for you. Always read the medicine label and follow directions.      Take your medicine as directed. Contact your healthcare provider if you think your medicine is not helping or if you have side effects. Tell him or her if you are allergic to any medicine. Keep a list of the medicines, vitamins, and herbs you take. Include the amounts, and when and why you take them. Bring the list or the pill bottles to follow-up visits. Carry your medicine list with you in case of an emergency.    How the 2019 coronavirus spreads: The virus appears to spread quickly and easily. The following are ways the virus is thought to spread, but more information may be coming:     Droplets are the most common way all coronaviruses spread. The virus can travel in droplets that form when a person talks, coughs, or sneezes. Anyone who breathes in the virus or gets it in his or her eyes can become infected.      An infected person may be able to leave the virus on objects and surfaces. Another person can get the virus on his or her hands by touching the object or surface. Infection happens if the person then touches his or her eyes or mouth with unwashed hands. It is not yet known how long the virus can stay on an object or surface. Evidence shows it may be as long as 9 days at room temperature.      Person-to-person contact may spread the virus. For example, an infected person can spread the virus by shaking hands with someone. At this time, it does not appear that the virus can be passed to a baby during pregnancy or delivery. The virus also does not appear to spread during breastfeeding. If you are pregnant or breastfeeding, talk to your healthcare provider or obstetrician about any concerns you have.      An infected animal may be able to infect a person who touches it. This may happen at live markets or on a farm.    Prevent a 2019 coronavirus infection: Everyone should do the following to prevent getting or spreading the virus:     Wash your hands often. Use soap and water every time you wash your hands. Rub your soapy hands together, lacing your fingers. Use the fingers of one hand to scrub under the nails of the other hand. Wash for at least 20 seconds. Rinse with warm, running water for several seconds. Then dry your hands. Use germ-killing gel if soap and water are not available. Do not touch your eyes or mouth without washing your hands first. Handwashing           Cover a sneeze or cough. Use a tissue that covers your mouth and nose. Throw the tissue away in a trash can right away. Use the bend of your arm if a tissue is not available. Wash your hands well with soap and water or use a hand . Do not stand close to anyone who is sneezing or coughing.      Be careful around others. The best way to prevent infection is to avoid anyone who is infected, but this may be difficult. An infected person may be able to spread the virus before signs or symptoms begin. Until more is known, you may not want to shake hands with others. If you do shake hands, wash your hands or use hand  as soon as possible. You do not need to wear a medical mask if you are well and not caring for an infected person.      Ask about vaccines you may need. No vaccine is available for the new coronavirus. But any infection can affect your immune system. A weakened immune system makes you more vulnerable to the new coronavirus. Until a vaccine against the new virus is developed, do the following:   Get a flu vaccine as soon as recommended each year. The flu vaccine is available starting in September or October. Flu viruses change, so it is important to get a flu vaccine every year.      Talk to your healthcare provider about your vaccine history. Tell him or her if you did not get certain vaccines as a child, or you did not get all recommended doses. Tell him or her if you do not know your vaccine history. He or she will tell you which vaccines you need, and when to get them.           If you have COVID-19: Healthcare providers will give you specific instructions to follow. The following are general guidelines to remind you of how to keep others safe until you are well:     Limit close contact with others. Your healthcare provider will tell you when it is okay to be around others. This may be when you do not have a fever, do not take fever medicine, and have no symptoms. Fluid from your respiratory tract will need to test negative for the virus 2 times at least 24 hours apart. Until then, do the following along with any instructions from your provider:   Only go out of the house for medical appointments. Always call the provider’s office first so he or she can prepare to keep others safe.      Stay at least 6 feet (2 meters) away from others.      Sleep in a different room from others in the house.      Do not shake hands with other people.      Wear a medical mask when others are near you. This can help prevent droplets from spreading the virus when you talk, sneeze, or cough.      Do not share items. Do not share dishes, towels, or other items with anyone. Items need to be washed after you use them.      Do not handle live animals. The virus may be spread to animals, including pets. Until more is known, it is best not to touch, play with, or handle live animals.    Self-care:     Drink more liquids as directed. Liquids will help thin and loosen mucus so you can cough it up. Liquids will also help prevent dehydration. Liquids that help prevent dehydration include water, fruit juice, and broth. Do not drink liquids that contain caffeine. Caffeine can increase your risk for dehydration. Ask your healthcare provider how much liquid to drink each day.      Soothe a sore throat. Gargle with warm salt water. This may help your sore throat feel better. Make salt water by dissolving ¼ teaspoon salt in 1 cup warm water. You may also suck on hard candy or throat lozenges. You may use a sore throat spray.      Use a humidifier or vaporizer. Use a cool mist humidifier or a vaporizer to increase air moisture in your home. This may make it easier for you to breathe and help decrease your cough.      Use saline nasal drops as directed. These help relieve congestion.      Apply petroleum-based jelly around the outside of your nostrils. This can decrease irritation from blowing your nose.      Do not smoke. Nicotine and other chemicals in cigarettes and cigars can make your symptoms worse. They can also cause infections such as bronchitis or pneumonia. Ask your healthcare provider for information if you currently smoke and need help to quit. E-cigarettes or smokeless tobacco still contain nicotine. Talk to your healthcare provider before you use these products.    If you take care of someone who has COVID-19:     Wear a medical mask when you are near the person. This can help protect you from droplets that carry the virus when the person talks, sneezes, or coughs.      Do not allow others to go near the person. No one should come to the person's home unless it is necessary. Keep the room's door shut unless you need to go in or out.      Make sure the person's room has good air flow. You may be able to open the window if the weather allows. An air conditioner can also be turned on to help air move.      Clean items the person uses or touches. Wear disposable gloves to handle the person's laundry. Place the laundry in a plastic bag. Use hot water and soap to wash the person's laundry. Wash eating utensils and other items after the person uses them. Throw your gloves away after you use them.      Clean surfaces often. Use bleach diluted with water or disinfecting wipes. Clean counters, doorknobs, toilet seats, and other surfaces.    Follow up with your doctor as directed: Write down your questions so you remember to ask them during your visits.    For more information:     Centers for Disease Control and Prevention  [address] [place], [place]  Phone: [phone]  Phone: [phone]  Web Address: http://www.cdc.gov      Abuse of Alcohol    WHAT YOU NEED TO KNOW:    Alcohol abuse means you drink more than the recommended daily or weekly limits. You may be drinking alcohol regularly or drinking large amounts in a short period of time (binge drinking). You continue to drink even when it causes legal, work, or relationship problems.    DISCHARGE INSTRUCTIONS:    Call your local emergency number (911 in the [place]), or have someone call if:   •You have sudden chest pain or trouble breathing.      •You want to harm yourself or others.      •You have a seizure.      Seek care immediately if:   •You cannot stop vomiting or you vomit blood.          Call your doctor if:   •You have hallucinations (you see or hear things that are not real).      •You have questions or concerns about your condition or care.      Medicines:   •Vitamin supplements may be given to treat low vitamin levels. Alcohol can make it hard for your body to absorb enough vitamins such as B1. Vitamin supplements may also be given to prevent alcohol-related brain damage.       •Take your medicine as directed. Contact your healthcare provider if you think your medicine is not helping or if you have side effects. Tell him or her if you are allergic to any medicine. Keep a list of the medicines, vitamins, and herbs you take. Include the amounts, and when and why you take them. Bring the list or the pill bottles to follow-up visits. Carry your medicine list with you in case of an emergency.      Health problems alcohol abuse can cause:   •Cancer in your liver, pancreas, stomach, colon, kidney, or breast      •Stroke or a heart attack      •Liver, kidney, or lung disease      •Blackouts, memory loss, brain damage, or dementia      •Diabetes, immune system problems, or thiamine (vitamin B1) deficiency      •Problems for you and your baby if you drink while pregnant      Recommended alcohol limits: One drink is defined as 12 oz of beer, 5 oz of wine, or 1.5 oz of liquor such as whiskey.  •Men 21 to 64 years should limit alcohol to 2 drinks a day. Do not have more than 4 drinks in 1 day or more than 14 in 1 week.      •All women, and men 65 or older should limit alcohol to 1 drink in a day. Do not have more than 3 drinks in 1 day or more than 7 in 1 week. Do not drink alcohol if you are pregnant.      Manage alcohol use:   •Work with healthcare providers on goals to drink less. This can help prevent health problems. For example, you may start by planning your weekly alcohol use. It will be easier to have fewer drinks if you plan ahead.      •Have food when you drink alcohol. Food will prevent alcohol from getting into your system too quickly. Eat before you have your first alcohol drink.      •Time your drinks carefully. Have no more than 1 drink in an hour. Have a liquid such as water, coffee, or a soft drink between alcohol drinks.      •Do not drive if you have had alcohol. Plan ahead so you have a safe ride home. Make sure someone who has not been drinking can help you get home safely. Plan to use a taxi or other ride service, if needed.      •Do not drink alcohol if you are taking medicine. Alcohol is dangerous when you combine it with certain medicines, such as acetaminophen or blood pressure medicine. Talk to your healthcare provider about all the medicines you currently take.      Follow up with your doctor as directed: Write down your questions so you remember to ask them during your visits.    For support and more information:   •Alcoholics Anonymous  Web Address: http://www.aa.org      •Substance Abuse and Mental Health Services Administration  [address]  [place] [address]  Web Address: http://www.samhsa.gov

## 2022-01-02 NOTE — ED ADULT NURSE NOTE - OBJECTIVE STATEMENT
41 yr old male ambulatory to the ED for ETOH withdrawal.  Pt was recently at [facility] for detox but left because the department was "too busy".  Pt reports that he drank prior to arrival.  Pt drinks every day. 41 yr old male ambulatory to the ED for ETOH withdrawal.  Pt was recently at [facility] for detox but left because the department was "too busy".  Pt reports that he drank prior to arrival.  Pt drinks every day. States her drinks a pint of bourbon and a couple of beers a day. 41 yr old male ambulatory to the ED for ETOH withdrawal.  Pt was recently at [facility] for detox but left because the department was "too busy".  Pt reports that he drank prior to arrival.  Pt drinks every day. States her drinks a pint of bourbon and a couple of beers a day.  Pt denies any H/A, or N/V.  Slight tremor noted with extension of hands.  Pt denies any visual or auditory hallucinations.  No acute resp distress noted. Resp even and unlabored. Abd soft, NT. +BS. +PP. GOSS. Skin warm, dry and intact. 18 G IV placed to RT hand. Bloods obtained and sent. Safety maintained.

## 2022-01-02 NOTE — ED ADULT NURSE REASSESSMENT NOTE - NS ED NURSE REASSESS COMMENT FT1
Pt dc'd IV, agitated and does not want to stay in hospital.  Reports "I don't know what is going".  Medical screening offered and declined.  Pt A&Ox4. Steady gait.  Aware that he is leaving AMA. Educated on risks of leaving AMA including death.  Pt refusing to sign paperwork.

## 2022-01-02 NOTE — PROGRESS NOTE ADULT - PROBLEM SELECTOR PLAN 2
- PCR positive on 12/30, but has been feeling symptoms since last Saturday  - Currently saturating well on RA  - Non-vaccinated   - Conservative mgmt
- PCR positive on 12/30, but has been feeling symptoms since last Saturday  - Currently saturating well on RA  - Non-vaccinated   - Conservative mgmt

## 2022-01-02 NOTE — ED ADULT TRIAGE NOTE - CHIEF COMPLAINT QUOTE
pt came in with c/o nausea and requesting detox, report has drank liquor a few minutes PTA. States he was admitted at [facility] for detox but left this morning due to hospital being too busy.

## 2022-01-02 NOTE — DISCHARGE NOTE NURSING/CASE MANAGEMENT/SOCIAL WORK - NSDCPEFALRISK_GEN_ALL_CORE
For information on Fall & Injury Prevention, visit: [email or web address] OR  [email or web address] OR  https://www.cdc.gov/steadi/patient.html

## 2022-01-02 NOTE — PROGRESS NOTE ADULT - PROBLEM SELECTOR PLAN 3
- History cocaine, marijuana use  - Avoid beta blockers
- History cocaine, marijuana use  - Avoid beta blockers

## 2022-01-02 NOTE — ED PROVIDER NOTE - PATIENT PORTAL LINK FT
You can access the FollowMyHealth Patient Portal offered by [facility] by registering at the following website: [email or web address]. By joining [employer]’s FollowMyHealth portal, you will also be able to view your health information using other applications (apps) compatible with our system.

## 2022-01-02 NOTE — ED PROVIDER NOTE - OBJECTIVE STATEMENT
pt c h/o alcohol abuse, withdrawal, p/w "alcohol withdrawal", severe, wants to "detox" pt c h/o alcohol abuse, withdrawal, p/w "alcohol withdrawal", severe, wants to "detox". pt drinks about 1-2 pints hard liquor daily usually. was admitted to [facility] for last 3 days for alcohol withdrawal. pt left ama this morning because hospital was too busy per pt. sister tried to take him to detox program but was rejected due to +covid status.  pt started getting very shaky, nauseous, agitated. self treated with ~1 pint alcohol PTA.  no si/hi/hallucinations. pt c h/o alcohol abuse, withdrawal, p/w "alcohol withdrawal", severe, wants to "detox". pt drinks about 1-2 pints hard liquor daily usually. was admitted to [facility] for last 3 days for alcohol withdrawal. pt left ama this morning because hospital was too busy per pt. sister tried to take him to detox program but was rejected due to +covid status.  pt started getting very shaky, nauseous, agitated. self treated with ~1 pint alcohol PTA.  no si/hi/hallucinations. currently feels better, pt states, because he drank before coming

## 2022-01-02 NOTE — PROGRESS NOTE ADULT - ASSESSMENT
41M with PMH depression, alcohol use admitted for alcohol withdrawal and COVID-19. On CIWA protocol and Ativan taper. 
41M with PMH depression, alcohol use admitted for alcohol withdrawal and COVID-19. On CIWA protocol and Ativan taper.

## 2022-01-02 NOTE — DISCHARGE NOTE NURSING/CASE MANAGEMENT/SOCIAL WORK - PATIENT PORTAL LINK FT
You can access the FollowMyHealth Patient Portal offered by [facility] by registering at the following website: [email or web address]. By joining [employer]’s FollowMyHealth portal, you will also be able to view your health information using other applications (apps) compatible with our system.

## 2022-01-02 NOTE — DISCHARGE NOTE NURSING/CASE MANAGEMENT/SOCIAL WORK - NSDCFUADDAPPT_GEN_ALL_CORE_FT
[facility] program: [phone]. Please call this number for further guidance on how to reduce your alcohol use

## 2022-01-02 NOTE — ED PROVIDER NOTE - CLINICAL SUMMARY MEDICAL DECISION MAKING FREE TEXT BOX
pt c h/o alcohol abuse requesting detox.  just JESUS'ed from [facility] this mornign after 3 nights for detox/withdrawal.  pt just drank 1 pint hard alcohol before coming to ED. currently appears mildly intoxicated as oppose to withdrawing. explained to pt and sister/wife that we are not a detox or substance abuse center and do not typically admit for "detox", but that we will treat and admit pts for withdrawal.  family states that pt was showing withdrawal sxs earlier, with shakes and agitation, which is why he drank before coming to ED. I told pt and family that we will admit him for anticipated withdrawal . check labs, give iv fluids, ativan.     update: pt took out his own IV, states he was not happy with care here and wants to leave.  I offered pt additional medications /ativan. pt declining. I spoke with his wife Tariq together with pt on phone to encourage pt to stay for further treatment as pt may start withdrawing later on.  pt refusing.  Wife tried to convince pt to stay, pt hung up on her. I asked if pt would wait for proper dc as I could prescribe meds /benzo to help with withdrawal sxs. pt declined and would not wait for rx or dc instructions. I asked if he would perhaps wait for family to come get him, pt refused. pt was fully awake, alert, Ox 3, coherent. had a very steady brisk gait as he left ED.  I immediately called spouse tariq and informed her that pt left and refused to stay

## 2022-01-02 NOTE — ED ADULT NURSE NOTE - CHIEF COMPLAINT QUOTE
pt came in with c/o nausea and requesting detox, report has drank liquor a few minutes PTA. States he was admitted at [facility] for detox but left this morning due to hospital being too busy.

## 2022-01-03 PROBLEM — F10.10 ALCOHOL ABUSE, UNCOMPLICATED: Chronic | Status: ACTIVE | Noted: 2021-12-31

## 2022-03-02 NOTE — PATIENT PROFILE ADULT - FALL HARM RISK - HARM RISK INTERVENTIONS
aggressive behavior
Assistance with ambulation/Assistance OOB with selected safe patient handling equipment/Communicate Risk of Fall with Harm to all staff/Monitor for mental status changes/Monitor gait and stability/Reinforce activity limits and safety measures with patient and family/Tailored Fall Risk Interventions/Toileting schedule using arm’s reach rule for commode and bathroom/Use of alarms - bed, chair and/or voice tab/Visual Cue: Yellow wristband and red socks/Bed in lowest position, wheels locked, appropriate side rails in place/Call bell, personal items and telephone in reach/Instruct patient to call for assistance before getting out of bed or chair/Non-slip footwear when patient is out of bed/[place]erry to call system/Physically safe environment - no spills, clutter or unnecessary equipment/Purposeful Proactive Rounding/Room/bathroom lighting operational, light cord in reach

## 2022-05-06 ENCOUNTER — APPOINTMENT (OUTPATIENT)
Dept: VASCULAR SURGERY | Facility: CLINIC | Age: 42
End: 2022-05-06
Payer: COMMERCIAL

## 2022-05-06 VITALS
DIASTOLIC BLOOD PRESSURE: 93 MMHG | BODY MASS INDEX: 28.44 KG/M2 | SYSTOLIC BLOOD PRESSURE: 134 MMHG | TEMPERATURE: 97.3 F | WEIGHT: 210 LBS | HEART RATE: 99 BPM | HEIGHT: 72 IN

## 2022-05-06 DIAGNOSIS — I83.891 VARICOSE VEINS OF RIGHT LOWER EXTREMITY WITH OTHER COMPLICATIONS: ICD-10-CM

## 2022-05-06 PROCEDURE — 99204 OFFICE O/P NEW MOD 45 MIN: CPT

## 2022-05-06 PROCEDURE — 93970 EXTREMITY STUDY: CPT

## 2022-05-06 RX ORDER — ESCITALOPRAM OXALATE 5 MG/1
TABLET, FILM COATED ORAL
Refills: 0 | Status: ACTIVE | COMMUNITY

## 2022-05-06 NOTE — HISTORY OF PRESENT ILLNESS
[FreeTextEntry1] : 42M with bilat LE varicose veins, R>L. Denies heaviness, pain, swelling, numbness/tingling.  Denies claducation. No history of prior DVT.  \par \par PMH- anxiety\par PSH- denies \par Meds- lexipro \par Allergies- NKDA\par Social- + tobacco use 1ppd /25 years, +EtOH use- 3-4 beers daily, occasional liquor, denies illicit drug use.  Works as \par FH- denies bleeding/coagulopathy disorders\par \par

## 2022-05-06 NOTE — ASSESSMENT
[FreeTextEntry1] : 42M with venous insufficiency CEAP 2.\par \par venous duplex with R GSV reflux.\par \par Recommend compression stockings 20-30mmHg and conservative treatment at this time\par Counselled regarding smoking cessation\par Follow up as necessary

## 2022-05-06 NOTE — PHYSICAL EXAM
[Normal Breath Sounds] : Normal breath sounds [Normal Heart Sounds] : normal heart sounds [2+] : left 2+ [Varicose Veins Of Lower Extremities] : bilaterally [Ankle Swelling On The Right] : mild [No Rash or Lesion] : No rash or lesion [Alert] : alert [Oriented to Person] : oriented to person [Oriented to Place] : oriented to place [Oriented to Time] : oriented to time [Calm] : calm [JVD] : no jugular venous distention  [Ankle Swelling (On Exam)] : not present [] : not present [Skin Ulcer] : no ulcer [Skin Induration] : no induration [de-identified] : NAD [FreeTextEntry1] : Mild varicose veins bilat calf, R>L

## 2022-05-06 NOTE — END OF VISIT
[] : Fellow [FreeTextEntry3] : CEAP 2, symptoms do not impact adls\par rec stockings, fu if symptoms worsen [Time Spent: ___ minutes] : I have spent [unfilled] minutes of time on the encounter.

## 2022-07-07 NOTE — ED ADULT NURSE NOTE - PAIN RATING/NUMBER SCALE (0-10): ACTIVITY
0 Patient has been living in assisted living at [address] [place] [address] since 8/10/21 due to vision loss. Medication list is verified and up to date as of this writing. For the past month, patient has refused to take Spironolactone because of frequent urination.     Vaishali Camara RN calling to advise

## 2025-01-08 NOTE — ED ADULT NURSE NOTE - PRIMARY CARE PROVIDER
Inpatient consult to Cardiology  Consult performed by: Larisa Smith, RENEA-CNP  Consult ordered by: Lashonad Colon, RENEA-CNP, LANDON  Reason for consult: shock unclear etiology cardiogenic?      History Of Present Illness:    Mikhail Moses is a 78 y.o. male with past medical history significant for Coronary artery disease status post multiple PCIs (PRISCILLA [place] stents to distal RCA 2000, PRISCILLA to the PLB in 2001, PCI of PLB and jailed PDA and subbranch of PLB with ABBY for in-stent restenosis 2007, ABBY to distal RCA 2021 in setting of NSTEMI with residual  of LAD), Hypertension, Hyperlipidemia, Chronic kidney disease COPD, GERD, DVT (left popliteal, peroneal and tibial veins 6/2023), Pulmonary hypertension, Hiatal hernia, Urethral stricture, Gout, Knee osteoarthritis.  Presented for scheduled right knee total arthroplasty. Cardiology is consulted for shock unclear etiology cardiogenic?    Patient presented for scheduled right knee total arthroplasty yesterday.  Hospital course complicated by hypoxia requiring high flow nasal cannula.  Patient also hypotensive.  He was transferred to ICU for further management. Labs today show ABG pH 7.28 PCO2 43 pO2 71 Bicarb 20.2 lactate 2.4. He was seen by pulmonary.  VQ scan showed low to intermediate probability for acute pulmonary embolism.  Chest x-ray interpreted as cardiomegaly diffuse bilateral interstitial prominence, patchy density in the right lung base atelectasis versus infiltrate no pleural effusion. He is currently on levophed and vasopressor support.  On airVo 70% 50L. Chest x-ray showed concerns for     Patient follows with cardiologist Dr. [facility] at [facility].    Home CV meds  Eliquis 5 mg BID- hx of DVT  Atorvastatin 10 mg daily  Lasix 40 mg daily   Metoprolol succinate 25 mg daily  Ramipril 10 mg daily        Last Recorded Vitals:  Vitals:    01/08/25 1100 01/08/25 1115 01/08/25 1130 01/08/25 1145   BP: 90/55 91/54 88/56    Patient Position:       Pulse: 78 77 79 77  "  Resp: 24 19 21 23   Temp:       TempSrc:       SpO2: 95% 95%     Weight:       Height:           Last Labs:  LABS:  CMP:  Results from last 7 days   Lab Units 01/08/25  1248 01/08/25  0535 01/08/25  0051   SODIUM mmol/L 141 142 142   POTASSIUM mmol/L 3.7 3.2* 4.3   CHLORIDE mmol/L 108* 108* 106   CO2 mmol/L 23 24 29   ANION GAP mmol/L 14 13 11   BUN mg/dL 18 16 15   CREATININE mg/dL 2.59* 2.16* 1.78*   EGFR mL/min/1.73m*2 24* 30* 38*   MAGNESIUM mg/dL  --  0.85*  --    ALBUMIN g/dL 2.9*  --   --      CBC:  Results from last 7 days   Lab Units 01/08/25  1248 01/08/25  0535 01/08/25  0051   WBC AUTO x10*3/uL 15.9* 7.4 10.6   HEMOGLOBIN g/dL 10.0* 10.6* 12.0*   HEMATOCRIT % 30.8* 34.0* 38.3*   PLATELETS AUTO x10*3/uL 224 200 221   MCV fL 92 94 94     COAG:   Results from last 7 days   Lab Units 01/08/25  1248   INR  1.2*     ABO: No results found for: \"ABO\"  HEME/ENDO:     CARDIAC:   Results from last 7 days   Lab Units 01/08/25  0535   TROPHS ng/L 30*   BNP pg/mL 273*   No results for input(s): \"CHOL\", \"LDLF\", \"LDL\", \"LDLCALC\", \"HDL\", \"TRIG\" in the last 99502 hours.   Last I/O:  I/O last 3 completed shifts:  In: 2812.9 (28.5 mL/kg) [P.O.:615; I.V.:2197.9 (22.2 mL/kg)]  Out: 95 (1 mL/kg) [Drains:70; Blood:25]  Weight: 98.8 kg     Past Cardiology Tests (Last 3 Years):  EKG:  Electrocardiogram 1/8/2024      Electrocardiogram 1/8/2024        Echo:  1/8/2025  1. Poorly visualized anatomical structures due to suboptimal image quality.  2. Entire apex is abnormal.  3. Left ventricular ejection fraction is mildly decreased, by visual estimate at 45-50%.  4. No left ventricular thrombus visualized.  5. Unable to determine right ventricular systolic function.  6. Right ventricle not well seen. Some interventricular septal flattening which could represent right sided pressure and volume overload.    10/8/2023        5/19/2020        10/13/2016: LVEF 60-65%. PASP 47 mm Hg. Normal otherwise.    Cath:  Coronary Artery Stents Per " outpatient cardiology notes  7/2/2021: 4 x 15 mm Synergy ABBY, post dilated to 4.5 mm in distal RCA by Dr. Dickinson for NSTEMI.    of LAD. 04- - ABBY to posterolateral RCA: Dr. Strange 04- - PRISCILLA royal stent to the posterolateral RCA: Dr. Strange 11/2000 - PRISCILLA [place] stents to the distal RCA.    Stress Test:  10/01/2023- [facility] [place]      Stress Test - 06/14/2021- Per outpatient cardiology note    Lexiscan nuclear: Moderately large area of moderate inferior and inferolateral wall ischemia. Small apical infarction. LVEF 69%    Cardiac Imaging:  No results found for this or any previous visit from the past 1095 days.      Past Medical History:  He has a past medical history of Alcohol abuse, Anemia, BPH (benign prostatic hyperplasia), CAD (coronary artery disease), Chronic edema, CKD (chronic kidney disease), COPD (chronic obstructive pulmonary disease) (Multi), GERD (gastroesophageal reflux disease), Gout, H/O non-ST elevation myocardial infarction (NSTEMI) (07/2021), History of blood transfusion, HLD (hyperlipidemia), HTN (hypertension), deep venous thrombosis (2023), OA (osteoarthritis), Old myocardial infarction, PAH (pulmonary artery hypertension) (Multi), Sepsis with acute hypoxic respiratory failure without septic shock, due to unspecified organism (Multi) (10/2024), and Urethral stricture.    Past Surgical History:  He has a past surgical history that includes Splenectomy, total; Coronary angioplasty with stent; Urethroplasty; Fracture surgery; Hernia repair; Carpal tunnel release (Right); Knee Arthroplasty (Left, 2023); Lumbar spine surgery (10/2024); and Cataract extraction (Bilateral).      Social History:  He reports that he quit smoking about 20 years ago. His smoking use included cigarettes. He started smoking about 61 years ago. He has a 61.5 pack-year smoking history. He has never used smokeless tobacco. He reports that he does not currently use  alcohol after a past usage of about 28.0 standard drinks of alcohol per week. He reports that he does not use drugs.    Family History:  Family History   Problem Relation Name Age of Onset    No Known Problems Mother      Heart attack Father      No Known Problems Half-Sister      No Known Problems Half-Sister          Allergies:  Patient has no known allergies.    Inpatient Medications:  Scheduled medications   Medication Dose Route Frequency    acetaminophen  650 mg oral q6h ANGEL    allopurinol  300 mg oral Daily    atorvastatin  10 mg oral Daily    docusate sodium  100 mg oral BID    donepezil  5 mg oral Nightly    folic acid  1 mg oral Daily    furosemide  40 mg oral Daily    [Held by provider] lisinopril  20 mg oral Daily    magnesium sulfate  4 g intravenous Once    [Held by provider] metoprolol succinate XL  25 mg oral Daily    multivitamin with minerals  1 tablet oral Daily    [Held by provider] oral hydration  75 mL oral q1h while awake    pantoprazole  40 mg oral Daily before breakfast    polyethylene glycol  17 g oral Daily    [Held by provider] pregabalin  300 mg oral BID    tamsulosin  0.4 mg oral Nightly    thiamine  100 mg oral Daily     PRN medications   Medication    benzocaine-menthol    bisacodyl    bisacodyl    cyclobenzaprine    heparin    HYDROmorphone    ipratropium-albuteroL    metoclopramide    Or    metoclopramide    naloxone    ondansetron    Or    ondansetron    oxyCODONE    oxygen    oxygen     Continuous Medications   Medication Dose Last Rate    heparin  0-4,500 Units/hr 1,800 Units/hr (01/08/25 1312)    norepinephrine  0-0.5 mcg/kg/min      vasopressin  0.03 Units/min       Outpatient Medications:  Current Outpatient Medications   Medication Instructions    acetaminophen (TYLENOL EXTRA STRENGTH) 1,000 mg, oral, Every 6 hours PRN    allopurinol (ZYLOPRIM) 300 mg, oral, Daily    apixaban (Eliquis) 2.5 mg tablet Take 1 tablet (2.5 mg) by mouth 2 times a day for 5 days then resume home  dose of 5mg twice daily as directed.    atorvastatin (Lipitor) 10 mg tablet 1 tablet, Daily    chlorhexidine (Peridex) 0.12 % solution 15 ml swish and spit for 30 seconds night prior to surgery and morning of surgery    docusate sodium (COLACE) 100 mg, oral, 2 times daily    donepezil (ARICEPT) 5 mg, oral, Nightly    ergocalciferol (VITAMIN D-2) 50,000 Units, oral, Weekly    finasteride (Proscar) 5 mg tablet 1 tablet, Daily (0630)    furosemide (Lasix) 40 mg tablet 1 tablet, Daily    metoprolol succinate XL (TOPROL-XL) 25 mg, Daily    nitroglycerin (NITROSTAT) 0.4 mg, Every 5 min PRN    oxyCODONE (ROXICODONE) 5 mg, oral, Every 6 hours PRN    pantoprazole (ProtoNix) 40 mg EC tablet 1 tablet, 2 times daily    pantoprazole (PROTONIX) 40 mg, oral, Daily, Do not crush, chew, or split.    polyethylene glycol (Glycolax, Miralax) 17 gram/dose powder Mix 1 cap (17g)of powder into 8 ounces of fluid and drink once daily.    pregabalin (LYRICA) 300 mg, 2 times daily    ramipril (Altace) 10 mg capsule 1 capsule, Daily    tamsulosin (FLOMAX) 0.4 mg, Nightly    traMADol (ULTRAM) 50 mg, oral, Every 6 hours PRN       Physical Exam:  GENERAL: Obtunded   SKIN: warm, dry  NECK: Unable to assess JVD due to central line in place   CARDIAC: Regular rate and rhythm no murmurs  PULMONARY: Normal respiratory efforts, Diminished ABDOMEN: soft, nondistended  EXTREMITIES: no lower extremity edema  NEURO:  Obtunded Moving extremity      Assessment/Plan   Mikhail Moses is a 78 y.o. male with past medical history significant for Coronary artery disease status post multiple PCIs (PRISCILLA [place] stents to distal RCA 2000, PRISCILLA to the PLB in 2001, PCI of PLB and jailed PDA and subbranch of PLB with ABBY for in-stent restenosis 2007, ABBY to distal RCA 2021 in setting of NSTEMI with residual  of LAD), Hypertension, Hyperlipidemia, Chronic kidney disease COPD, GERD, DVT (left popliteal, peroneal and tibial veins 6/2023), Pulmonary hypertension, Hiatal  hernia, Urethral stricture, Gout, Knee osteoarthritis.  Presented for scheduled right knee total arthroplasty. Cardiology is consulted for shock unclear etiology cardiogenic?    Home CV meds: Eliquis 5 mg BID- hx of DVT Atorvastatin 10 mg daily Lasix 40 mg daily Metoprolol succinate 25 mg daily Ramipril 10 mg daily     #Shock- etiology unclear'  Currently requiring vaso and levophed infusion.  Echo showed LVEF 45-50% apex abnormal Troponin minimally elevated at 30/45. VQ with low to intermittent probability for PE.     #Acute hypoxic respiratory failure  Currently on AirVo.  Xray with bilateral perihilar infiltrates/edema.     #Acute metabolic encephalopathy  #Hx of CAD with multiple stents with known  of LAD  ASA not listed at his home med. On Eliquis (hx of DVT)    #Hx of HTN  Hypotensive at present requiring pressors.     #JUMA on CKD    Recommendation   RHC to further delineate etiology of shock.   Consider CT chest         Code Status:  Full Code      Larisa Smith, APRN-CNP    STAFF ADDENDUM:    Both the HEATHER and I have had a face to face encounter with the patient today. I have examined the patient and edited the documented physical examination as necessary.  I personally reviewed the patient's vital signs, telemetry, recent labs, medications, orders, EKGs, and pertinent cardiac imaging/ echocardiography.  I have reviewed the HEATHER's encounter note, approve the HEATHER's documentation and have edited the note to reflect my diagnostic and therapeutic plan.      #Acute on chronic hypoxic respiratory failure  #Acute diastolic heart failure  #Acute renal insufficiency seen on CKD  #Shock  #Hypotension  #s/p knee surgery      78-year-old male with history of extensive coronary artery disease with multiple stents most recent of which was 2021 with ABBY to RCA and known LAD , CKD, HTN, COPD who has developed acute hypoxic respiratory failure post knee surgery yesterday.  Currently on FiO2 70% on high flow Airvo 50  L as well as 2 pressors (norepinephrine and vasopressin).  Etiology of his decompensation thus far is not entirely clear.  Suspicion less for PE with recent Eliquis use and just discontinued a few days prior, normal RV size and function on echocardiogram and low probability VQ scan.  His EF is mildly reduced with some apical wall motion abnormality which is probably secondary to his LAD .  He did have apical scar on stress test in 2023.  Perhaps acute on chronic systolic and diastolic heart failure on top of COPD.  Diminished urine output lately also concerning for cardiorenal syndrome.  Venous gas of 73% from SVC not suggestive of cardiogenic shock.  Troponin minimally elevated and relatively flat pattern in the setting of hours of hypoxia making ACS very low probability.    ?  Distributive shock    Reviewed case with interventional cardiology Dr. Odonnell who also does PERT consults.  He did not feel increased probability for PE.      Will pursue right heart catheterization for hemodynamic assessment and go from there.    Agree with at least noncontrast chest CT.      John Yanes, DO     unsure